# Patient Record
Sex: MALE | Race: WHITE | NOT HISPANIC OR LATINO | Employment: FULL TIME | URBAN - METROPOLITAN AREA
[De-identification: names, ages, dates, MRNs, and addresses within clinical notes are randomized per-mention and may not be internally consistent; named-entity substitution may affect disease eponyms.]

---

## 2019-12-04 ENCOUNTER — TRANSFERRED RECORDS (OUTPATIENT)
Dept: HEALTH INFORMATION MANAGEMENT | Facility: CLINIC | Age: 49
End: 2019-12-04

## 2020-12-11 ENCOUNTER — TRANSFERRED RECORDS (OUTPATIENT)
Dept: HEALTH INFORMATION MANAGEMENT | Facility: CLINIC | Age: 50
End: 2020-12-11

## 2020-12-22 ENCOUNTER — TRANSFERRED RECORDS (OUTPATIENT)
Dept: HEALTH INFORMATION MANAGEMENT | Facility: CLINIC | Age: 50
End: 2020-12-22

## 2021-01-25 ENCOUNTER — TRANSFERRED RECORDS (OUTPATIENT)
Dept: HEALTH INFORMATION MANAGEMENT | Facility: CLINIC | Age: 51
End: 2021-01-25

## 2021-07-21 ENCOUNTER — TRANSCRIBE ORDERS (OUTPATIENT)
Dept: OTHER | Age: 51
End: 2021-07-21

## 2021-07-21 DIAGNOSIS — G62.9 POLYNEUROPATHY: Primary | ICD-10-CM

## 2021-08-03 NOTE — TELEPHONE ENCOUNTER
RECORDS RECEIVED FROM: External   Date of Appt: 11/4/21   NOTES (FOR ALL VISITS) STATUS DETAILS   OFFICE NOTE from referring provider Care Everywhere Dr Trish Bravo @ [facility] (PCP):  7/20/21   OFFICE NOTE from other specialist Received Dr Phelps @ [facility] Neurology:  1/25/21    Dr Amy Miller @ [facility] PCP:  12/22/20 12/8/20   DISCHARGE SUMMARY from hospital N/A    DISCHARGE REPORT from the ER N/A    OPERATIVE REPORT N/A    MEDICATION LIST Care Everywhere    IMAGING  (FOR ALL VISITS)     EMG Received [facility] Neurology:  1/25/21    [facility]:  12/4/19   EEG N/A    LUMBAR PUNCTURE N/A    MIGUEL ÁNGEL SCAN N/A    ULTRASOUND (CAROTID BILAT) *VASCULAR* N/A    MRI (HEAD, NECK, SPINE) Received [facility]:  MRI Thoracic Spine 12/22/20  MRI Lumbar Spine 12/22/20  MRI Brain 12/11/20   CT (HEAD, NECK, SPINE) N/A       Action 8/3/21 MV 4.08pm   Action Taken Records request faxed to [facility]    Request for EMG faxed to [facility]    Records/imaging request faxed to [facility]     Action 8/4/21 MV 2.46pm   Action Taken EMG received from [facility] and sent to scanning     Action 8/5/21 MV 12.08pm   Action Taken Records received from [facility] and sent to scanning    Records received from [facility] and sent to scanning - images resolved in PACS

## 2021-11-04 ENCOUNTER — OFFICE VISIT (OUTPATIENT)
Dept: NEUROLOGY | Facility: CLINIC | Age: 51
End: 2021-11-04
Payer: COMMERCIAL

## 2021-11-04 ENCOUNTER — LAB (OUTPATIENT)
Dept: LAB | Facility: CLINIC | Age: 51
End: 2021-11-04
Payer: COMMERCIAL

## 2021-11-04 ENCOUNTER — PRE VISIT (OUTPATIENT)
Dept: NEUROLOGY | Facility: CLINIC | Age: 51
End: 2021-11-04

## 2021-11-04 VITALS
WEIGHT: 263 LBS | SYSTOLIC BLOOD PRESSURE: 144 MMHG | HEART RATE: 70 BPM | DIASTOLIC BLOOD PRESSURE: 83 MMHG | OXYGEN SATURATION: 99 % | RESPIRATION RATE: 16 BRPM

## 2021-11-04 DIAGNOSIS — R29.2 GENERALIZED HYPERREFLEXIA: ICD-10-CM

## 2021-11-04 DIAGNOSIS — R27.8 SENSORY ATAXIA: Primary | ICD-10-CM

## 2021-11-04 DIAGNOSIS — R27.8 SENSORY ATAXIA: ICD-10-CM

## 2021-11-04 LAB
HIV 1+2 AB+HIV1 P24 AG SERPL QL IA: NONREACTIVE
T PALLIDUM AB SER QL: NONREACTIVE
VIT B12 SERPL-MCNC: 376 PG/ML (ref 193–986)

## 2021-11-04 PROCEDURE — 82607 VITAMIN B-12: CPT | Performed by: PATHOLOGY

## 2021-11-04 PROCEDURE — 86255 FLUORESCENT ANTIBODY SCREEN: CPT | Mod: 90 | Performed by: PATHOLOGY

## 2021-11-04 PROCEDURE — 86038 ANTINUCLEAR ANTIBODIES: CPT | Mod: 90 | Performed by: PATHOLOGY

## 2021-11-04 PROCEDURE — 83519 RIA NONANTIBODY: CPT | Mod: 90 | Performed by: PATHOLOGY

## 2021-11-04 PROCEDURE — 99204 OFFICE O/P NEW MOD 45 MIN: CPT | Performed by: PSYCHIATRY & NEUROLOGY

## 2021-11-04 PROCEDURE — 84446 ASSAY OF VITAMIN E: CPT | Mod: 90 | Performed by: PATHOLOGY

## 2021-11-04 PROCEDURE — 87389 HIV-1 AG W/HIV-1&-2 AB AG IA: CPT | Mod: 90 | Performed by: PATHOLOGY

## 2021-11-04 PROCEDURE — 82525 ASSAY OF COPPER: CPT | Mod: 90 | Performed by: PATHOLOGY

## 2021-11-04 PROCEDURE — 36415 COLL VENOUS BLD VENIPUNCTURE: CPT | Performed by: PATHOLOGY

## 2021-11-04 PROCEDURE — 99000 SPECIMEN HANDLING OFFICE-LAB: CPT | Performed by: PATHOLOGY

## 2021-11-04 PROCEDURE — 86780 TREPONEMA PALLIDUM: CPT | Mod: 90 | Performed by: PATHOLOGY

## 2021-11-04 PROCEDURE — 86790 VIRUS ANTIBODY NOS: CPT | Mod: 90 | Performed by: PATHOLOGY

## 2021-11-04 RX ORDER — ERGOCALCIFEROL 1.25 MG/1
CAPSULE ORAL
COMMUNITY
Start: 2021-10-21

## 2021-11-04 ASSESSMENT — PAIN SCALES - GENERAL: PAINLEVEL: NO PAIN (0)

## 2021-11-04 NOTE — LETTER
2021       RE: Pernell Martinez  [address] [place] [id number]     Dear Colleague,    Thank you for referring your patient, Pernell Martinez, to the [facility] [place] at [facility]. Please see a copy of my visit note below.    Service Date: 2021    Trish Bravo MD  [id number] [place]  [address]    RE:  Pernell Mratinez  MRN:  [id number]  :  1970    Dear Dr. Bravo:    I had the pleasure to see Mr. Martinez at the [facility].  Thank you for this referral.  As you know, he is a 50-year-old man from [place], [place], whose chief complaint is numbness and unsteady gait.  Symptoms began about 16 months ago in the summer of 2020 without any obvious trigger.  He first had a left tennis elbow and he had a corticosteroid injection there that worked.  A few days later, he noticed numbness in the entire left hand affecting all fingers, but no pain there.  The numbness in the hand would not be positional or dependent on repetitive flexion of the wrist. Within a week, he had similar symptoms in the right hand with predominantly negative sensory symptoms, numbness, and then a few weeks later, the same happened at bilateral thighs anteriorly and medially, as well as at the feet and toes.  He describes very little if any neuropathic pain.  It is mostly loss of sensation and heaviness. It never bothers him when he is asleep.  He has noted over the past 6-7 months a patch of numbness in the abdominal wall approximately at the umbilicus, which encompasses it circumferentially and extends to the posterior back.  He does not have allodynia. He has noted some urgency of urination, especially in the last 6 months, but no definite incontinence.  He denies erectile dysfunction, dry eyes or dry mouth, signs of gastroparesis, orthostatic intolerance or syncope.  He feels that his gait is  unsteady and he falls quite often.  He also drops things from his hands.     He does not have any history of diabetes, cancer, radiation or chemotherapy.  He drinks alcohol very rarely.  Does not smoke, or use illicit drugs.  His mother probably had neuropathy in her legs.  His past medical history is relatively free.      He was offered an antidepressant for a few weeks, but it did not help him and he stopped it more than 3 months ago.  He denies any constitutional symptoms such as anorexia, weight loss, night sweats, fevers, blood in urine or stool, change in bowel habits or signs of connective tissue disease such as persistent joint swelling, oral ulcers, persistent skin rashes (other than known psoriasis), kidney problems, dysphagia, etc.  He denies any trouble with his vision.      He was seen at [facility] initially where he had an EMG and then at [facility] Neurology by Dr. Phelps who did an extensive EMG study of right upper and lower extremity, which was completely normal.  There was no evidence of neuropathy or myopathy.  He had an MRI of the brain, thoracic and lumbar spine at [facility] in 12/2020. 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  I did not have a chance to review the images.  Lumbar spine MRI was done without contrast and showed mild discogenic changes at L4-L5 but no significant central canal or neural foraminal stenosis or cauda equina lesion.  MRI thoracic spine also done 12/2020 was unremarkable, again without contrast.  In terms of labs, he had a Lyme titer, B12, TSH, comprehensive metabolic panel, hemogram and lipid panel that were normal.    BP (!) 144/83   Pulse 70   Resp 16   Wt 119.3 kg (263 lb)   SpO2 99%     On exam, he is awake, alert and oriented x3.  He looks a bit depressed with a flat affect on my exam.  Cranial nerve examination shows pupils equal, reactive to light and  accommodation.  Visual fields are full to confrontation bilaterally.  There is no afferent pupillary defect.  Extraocular muscles are intact.  I did not see any nystagmus or saccadic  abnormality.  There is no weakness of orbicularis oculi, oris, uvula, jaw, palate or tongue.  There is no dysphonia or dysarthria.  No tongue fasciculations or atrophy.  He has a slightly present jaw jerk, although not super brisk.  Neck flexion and extension strength are normal.  Strength is 5/5 in upper and lower extremities proximally and distally.  Muscle tone and bulk are normal.  Reflexes were very brisk throughout.  There were 3+ in biceps, triceps, brachioradialis, 3+ at the knees and I would say 4+ at the ankles as I was able to detect sustained ankle clonus of more than 3-4 beats on both sides.  Toes were bilaterally upgoing.  He had a positive Mateo reflex and positive pectoralis response as well.  Sensory exam was grossly abnormal.  He could not feel vibration at the toes.  It was at most a couple of seconds at the medial malleoli and the knees, and no more than 4-5 seconds at the index fingers.  All those numbers are abnormal.  Position sensation was intact.  Pinprick was better preserved and he could distinguish sharp from blunt in most locations.  Finger-to-nose was done without dysmetria. He was able to get up from a chair without assistance.  He was walking slightly wide based.  Romberg sign was clearly positive.  He could do tandem gait about 4 steps, but a bit unsteadily.    IMPRESSION:  Mr. Martinez's examination shows vibration sensory loss that is quite severe, and generalized hyperreflexia with Babinski sign.  Those signs put the problem in the central nervous system and not the peripheral.  Differential diagnosis is broad and initial MRIs were not informative.  This could be a demyelinating disease, a paraneoplastic syndrome, other CNS inflammatory disorder, etc.  Possibility of neoplastic disease like a brain  tumour is lower as this did not show up on his original scans.      He needs extensive workup. MRI of the brain, cervical and thoracic spine with contrast absolutely have to be repeated and I will get some labs today including a repeat B12 level, copper, vitamin E, RASHID antibodies, syphilis serology, paraneoplastic antibodies, HIV and HTLV.  If the brain scans remain normal and the labs are uninformative, he will need a CSF examination.  Because this problem is clearly not neuromuscular and outside the scope of my practice, I would recommend that he follows with a neurologist locally to address any potential abnormal findings of the workup I ordered.  I would recommend Dr. Ela Garza at [facility].    Thank you for allowing me to participate in the care of Mr. Maddox. Total time spent on this encounter today 55 minutes including 35 face-to-face with the patient, 10 on post-visit note dictation, editing and orders, and10 on pre-visit chart review.    Sincerely,    Ramy Ayala MD        D: 2021   T: 2021   MT: zina    Name:     SERJIO MADDOX  MRN:      [id number]        Account:      [id number]   :      1970           Service Date: 2021       Document: [id number]

## 2021-11-04 NOTE — PATIENT INSTRUCTIONS
Your exam shows sensory loss and generalized hyperreflexia (very increased to pathologic reflexes) and Babinski sign. Patients with those signs never have peripheral neuropathy or muscle disease explaining their presentation. They almost always have a disorder of the brain or the spinal cord, like MS, other inflammatory/autoimmune disease, paraneoplastic disease, or much less likely a brain tumor (if that were the case it would have been seen at your original brain scan)    We need to repeat your MRIs brain- cervical-thoracic spine, with contrast  Please get some lab tests today  If everything is still negative you will likely need a spinal tap- but I won't order it until I see the scans  Please consider following with a local Neurologist in [place] like Dr Ela Garza at [facility]

## 2021-11-04 NOTE — PROGRESS NOTES
Service Date: 2021    Trish Bravo MD  [facility]  [address] [place]  [address]    RE:  Pernell Martinez  MRN:  [id number]  :  1970    Dear Dr. Bravo:    I had the pleasure to see Mr. Martinez at the [facility].  Thank you for this referral.  As you know, he is a 50-year-old man from [place], [place], whose chief complaint is numbness and unsteady gait.  Symptoms began about 16 months ago in the summer of 2020 without any obvious trigger.  He first had a left tennis elbow and he had a corticosteroid injection there that worked.  A few days later, he noticed numbness in the entire left hand affecting all fingers, but no pain there.  The numbness in the hand would not be positional or dependent on repetitive flexion of the wrist. Within a week, he had similar symptoms in the right hand with predominantly negative sensory symptoms, numbness, and then a few weeks later, the same happened at bilateral thighs anteriorly and medially, as well as at the feet and toes.  He describes very little if any neuropathic pain.  It is mostly loss of sensation and heaviness. It never bothers him when he is asleep.  He has noted over the past 6-7 months a patch of numbness in the abdominal wall approximately at the umbilicus, which encompasses it circumferentially and extends to the posterior back.  He does not have allodynia. He has noted some urgency of urination, especially in the last 6 months, but no definite incontinence.  He denies erectile dysfunction, dry eyes or dry mouth, signs of gastroparesis, orthostatic intolerance or syncope.  He feels that his gait is unsteady and he falls quite often.  He also drops things from his hands.     He does not have any history of diabetes, cancer, radiation or chemotherapy.  He drinks alcohol very rarely.  Does not smoke, or use illicit drugs.  His mother probably had neuropathy in her legs.  His past medical history is  relatively free.      He was offered an antidepressant for a few weeks, but it did not help him and he stopped it more than 3 months ago.  He denies any constitutional symptoms such as anorexia, weight loss, night sweats, fevers, blood in urine or stool, change in bowel habits or signs of connective tissue disease such as persistent joint swelling, oral ulcers, persistent skin rashes (other than known psoriasis), kidney problems, dysphagia, etc.  He denies any trouble with his vision.      He was seen at [facility] initially where he had an EMG and then at [facility] Neurology by Dr. Phelps who did an extensive EMG study of right upper and lower extremity, which was completely normal.  There was no evidence of neuropathy or myopathy.  He had an MRI of the brain, thoracic and lumbar spine at [facility] in 12/2020. 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  I did not have a chance to review the images.  Lumbar spine MRI was done without contrast and showed mild discogenic changes at L4-L5 but no significant central canal or neural foraminal stenosis or cauda equina lesion.  MRI thoracic spine also done 12/2020 was unremarkable, again without contrast.  In terms of labs, he had a Lyme titer, B12, TSH, comprehensive metabolic panel, hemogram and lipid panel that were normal.    BP (!) 144/83   Pulse 70   Resp 16   Wt 119.3 kg (263 lb)   SpO2 99%     On exam, he is awake, alert and oriented x3.  He looks a bit depressed with a flat affect on my exam.  Cranial nerve examination shows pupils equal, reactive to light and accommodation.  Visual fields are full to confrontation bilaterally.  There is no afferent pupillary defect.  Extraocular muscles are intact.  I did not see any nystagmus or saccadic  abnormality.  There is no weakness of orbicularis oculi, oris, uvula, jaw, palate or tongue.  There is no dysphonia or  dysarthria.  No tongue fasciculations or atrophy.  He has a slightly present jaw jerk, although not super brisk.  Neck flexion and extension strength are normal.  Strength is 5/5 in upper and lower extremities proximally and distally.  Muscle tone and bulk are normal.  Reflexes were very brisk throughout.  There were 3+ in biceps, triceps, brachioradialis, 3+ at the knees and I would say 4+ at the ankles as I was able to detect sustained ankle clonus of more than 3-4 beats on both sides.  Toes were bilaterally upgoing.  He had a positive Mateo reflex and positive pectoralis response as well.  Sensory exam was grossly abnormal.  He could not feel vibration at the toes.  It was at most a couple of seconds at the medial malleoli and the knees, and no more than 4-5 seconds at the index fingers.  All those numbers are abnormal.  Position sensation was intact.  Pinprick was better preserved and he could distinguish sharp from blunt in most locations.  Finger-to-nose was done without dysmetria. He was able to get up from a chair without assistance.  He was walking slightly wide based.  Romberg sign was clearly positive.  He could do tandem gait about 4 steps, but a bit unsteadily.    IMPRESSION:  Mr. Martinez's examination shows vibration sensory loss that is quite severe, and generalized hyperreflexia with Babinski sign.  Those signs put the problem in the central nervous system and not the peripheral.  Differential diagnosis is broad and initial MRIs were not informative.  This could be a demyelinating disease, a paraneoplastic syndrome, other CNS inflammatory disorder, etc.  Possibility of neoplastic disease like a brain tumour is lower as this did not show up on his original scans.      He needs extensive workup. MRI of the brain, cervical and thoracic spine with contrast absolutely have to be repeated and I will get some labs today including a repeat B12 level, copper, vitamin E, RASHID antibodies, syphilis serology,  paraneoplastic antibodies, HIV and HTLV.  If the brain scans remain normal and the labs are uninformative, he will need a CSF examination.  Because this problem is clearly not neuromuscular and outside the scope of my practice, I would recommend that he follows with a neurologist locally to address any potential abnormal findings of the workup I ordered.  I would recommend Dr. Ela Garza at [facility].    Thank you for allowing me to participate in the care of Mr. Maddox. Total time spent on this encounter today 55 minutes including 35 face-to-face with the patient, 10 on post-visit note dictation, editing and orders, and10 on pre-visit chart review.    Sincerely,    Ramy Ayala MD        D: 2021   T: 2021   MT: zina    Name:     SERJIO MADDOX  MRN:      [id number]        Account:      [id number]   :      1970           Service Date: 2021       Document: [id number]

## 2021-11-05 LAB
ANA SER QL IF: NEGATIVE
HTLV I+II AB SER QL IA: NEGATIVE

## 2021-11-07 LAB
A-TOCOPHEROL VIT E SERPL-MCNC: 9.3 MG/L
BETA+GAMMA TOCOPHEROL SERPL-MCNC: 1 MG/L
COPPER SERPL-MCNC: 93.9 UG/DL

## 2021-11-09 LAB
AMPHIPHYSIN AB TITR SER: NEGATIVE TITER
CV2 IGG TITR SER: NEGATIVE TITER
GLIAL NUC TYPE 1 AB TITR SER: NEGATIVE TITER
HU1 AB TITR SER: NEGATIVE TITER
HU2 AB TITR SER IF: NEGATIVE TITER
HU3 AB TITR SER: NEGATIVE TITER
IMMUNOLOGIST REVIEW: NORMAL
LABORATORY COMMENT REPORT: NORMAL
NACHR AB SER-SCNC: 0 NMOL/L
PCA-1 AB TITR SER: NEGATIVE TITER
PCA-2 AB TITR SER: NEGATIVE TITER
PCA-TR AB TITR SER IF: NEGATIVE TITER
VGCC-P/Q BIND AB SER-SCNC: 0 NMOL/L
VGKC AB SER-SCNC: 0 NMOL/L

## 2021-11-29 ENCOUNTER — TRANSFERRED RECORDS (OUTPATIENT)
Dept: HEALTH INFORMATION MANAGEMENT | Facility: CLINIC | Age: 51
End: 2021-11-29
Payer: COMMERCIAL

## 2021-12-10 ENCOUNTER — OFFICE VISIT (OUTPATIENT)
Dept: NEUROSURGERY | Facility: CLINIC | Age: 51
End: 2021-12-10
Attending: NEUROLOGICAL SURGERY
Payer: COMMERCIAL

## 2021-12-10 VITALS — OXYGEN SATURATION: 98 % | HEART RATE: 74 BPM | SYSTOLIC BLOOD PRESSURE: 162 MMHG | DIASTOLIC BLOOD PRESSURE: 79 MMHG

## 2021-12-10 DIAGNOSIS — G99.2 STENOSIS OF CERVICAL SPINE WITH MYELOPATHY (H): Primary | ICD-10-CM

## 2021-12-10 DIAGNOSIS — Z01.818 PRE-OP TESTING: ICD-10-CM

## 2021-12-10 DIAGNOSIS — Z11.59 ENCOUNTER FOR SCREENING FOR OTHER VIRAL DISEASES: ICD-10-CM

## 2021-12-10 DIAGNOSIS — M48.02 STENOSIS OF CERVICAL SPINE WITH MYELOPATHY (H): Primary | ICD-10-CM

## 2021-12-10 PROCEDURE — G0463 HOSPITAL OUTPT CLINIC VISIT: HCPCS

## 2021-12-10 PROCEDURE — 99204 OFFICE O/P NEW MOD 45 MIN: CPT | Performed by: NEUROLOGICAL SURGERY

## 2021-12-10 NOTE — NURSING NOTE
Reviewed pre- and post-operative instructions as outlined in the After Visit Summary/Patient Instructions with patient.   Surgery folder was given to patient    Patient Education Topic: Procedure with Dr. Lees     Learner(s): Patient and Significant other/Spouse     Knowledge Level: Basic    Readiness to Learn: Ready    Method:  Verbal explanation and Written material     Outcome: Able to verbalize instructions    Barriers to Learning: No barrier    Covid Testing: patient educated they will need to have a test for the novel Coronavirus, COVID-19.The test needs to be completed within 4 days (96) hours of surgery. Order Placed.    Scheduling Number: [phone]    NDI/PEARL: Confirmation of completion within last 6 months     Patient had the opportunity for questions to be answered. Advised Patient and Significant other/Spouse  to call clinic with any questions/concerns. Gave patient antibacterial soap for pre-surgery skin preparation.

## 2021-12-10 NOTE — PATIENT INSTRUCTIONS
Patient Instructions    Surgery scheduled at [facility] for Right Cervical 5 corpectomy with cage reconstruction, plating, arthrodesis using local autograft. Sky sandoval placement with Dr. Lees    Pre-Operative    Surgical risks: blood clots in the leg or lung, problems urinating, nerve damage, drainage from the incision, infection,stiffness    Pre-operative physical with primary care physician within 30 days of surgical date.    2-4 night hospitalization stay    Shower procedure  o Please shower with antimicrobial soap the night before surgery and morning of surgery. Please refer to showering instruction sheet in folder.    Eating/Drinking  o Stop all solid foods 8 hours before surgery.  o Keep drinking clear liquids until 4 hours before surgery  - Clear liquids include water, clear juice, black coffee, or clear tea without milk, Gatorade, clear soda.     Medications  o Hold Aspirin, NSAIDs (Advil/Ibuprofen, Indocin, Naproxen,Nuprin,Relafen/Nabumetone, Diclofenac,Meloxicam, Aleve, Celebrex) x 7 days prior to surgical date  o Hold methotrexate, Xeljanz for 1-2 weeks prior to surgery and continue to hold 2 weeks post surgery.   o You can take Tylenol (Acetaminophen) for pain, 1000 mg  - Do not exceed 3,000 mg per day   o Any other medications prescribed, please discuss with your primary care provider at your pre-operative physical     As part of preparation for your upcoming procedure you are required to have a test for the novel Coronavirus, COVID-19.  o Please call the drive-up testing number to schedule your appointment. The test needs to be completed within 4 days (96) hours of surgery.   o Scheduling Number: [phone]  o You may NOT receive the COVID-19 vaccine 72 hours before or after surgery.    Pain Management    Dealing with pain  o As your body heals, you might feel a stabbing, burning, or aching pain. You may also have some numbness.  o Everyone feels pain differently, we may ask you  to rate your pain using a pain scale. This will let us know how much pain you feel.   o Keep in mind that medicine won't take away all of your pain. It helps to try other ways to relax and ease pain.     Things to help with pain  o After surgery, we will give you medicine for your pain. These medications work well, but they can make you drowsy, itchy, or sick to your stomach. If we give you narcotics for pain, try to take the pills with food.   o For mild to moderate pain, you can take medication such as Tylenol. These can be used with narcotics, but make sure that your narcotic does not contain Tylenol.   - Do NOT drive while taking narcotic pain medication  - Do NOT drink alcohol while using any pain medication  o You can utilize ice as needed (no longer than 20 minutes at one time)    You may resume taking NSAIDs (ex. Ibuprofen, aleve, naproxen) 12 weeks after surgery as it may cause bleeding and interfere with bone healing     Incision Care    No submerging incision in water such as pools, hot tubs, baths for at least 8 weeks or until incision is healed    You may get your incision wet in the shower. Allow water to run over incision, and gently pat dry.     Remove dressing as instructed upon discharge    Watch for signs of infection  o Redness, swelling, warmth, drainage, and fever of 101 degrees or higher  o Notify clinic [phone].    Activity Restrictions    For the first 6-8 weeks, no lifting > 10 pounds, no bending, twisting, or overhead reaching.    Take stairs in moderation     Ok to walk as tolerated, take short frequent walks. You may gradually increase the distance as tolerated.     Avoid bed rest and prolonged sitting for longer than 30 minutes (change positions frequently while awake)    No contact sports until after follow up visit    No high impact activities such as; running/jogging, snowmobile or 4 gruber riding or any other recreational vehicles    Please call the clinic if you develop any  of the following symptoms:  o Swelling and/or warmth in one or both legs  o Pain or tenderness in your leg, ankle, foot, or arm   o Red or discolored skin     Post-Op Follow Up Appointments    2 week staple/suture removal with RN    6 week post op with xray prior     3 months post op with xray prior     1 year post op with xray prior    Please call to schedule follow up and xray appointments at [phone]    Resources    If you are currently employed, you will need to be off work for 4-6 weeks for post op recovery and healing.    Please fax any FMLA/short term disability paperwork to [phone]    You may call our clinic when you'd like to return to work and we can provide a work letter    To allow staff adequate time to complete paperwork, please send as soon as possible

## 2021-12-10 NOTE — LETTER
12/10/2021         RE: Pernell Martinez  [address] [place] [id number]        Dear Colleague,    Thank you for referring your patient, Pernell Martinez, to the [facility] NEUROSURGERY [facility]INIC [place]. Please see a copy of my visit note below.    Dear Ramy,      It was a pleasure to see Pernell Martinez today in Neurosurgery Clinic. He is a 51 year old male who is here for evaluation of his cervical stenosis with myelopathy.    The patient describes about 1-1/2 years of progressive symptoms including numbness that started in the left hand but is now bilateral.  He also describes numbness of the hands and feet.  He has some neck aching but not a lot of pain.  He describes problems with balance and stiffness.  He has coordination problems of the hands with buttoning buttons and handwriting.    His symptoms have progressively worsened and this seems to extend have accelerated in the last month or 2.    No past medical history on file.  Past Medical History reviewed with patient during visit.  No past surgical history on file.  Past Surgical History reviewed with patient during visit.  Allergies   Allergen Reactions     Cats      Other reaction(s): Sneezing     Dogs      Other reaction(s): Sneezing     Hepatitis B Virus Vaccine Other (See Comments)     HEPATITIS LIKE SYMPTOMS     Other Environmental Allergy      Seasonal allergies       Current Outpatient Medications:      ergocalciferol (ERGOCALCIFEROL) 1.25 MG (89660 UT) capsule, 1 unit of Vitamin D equals 0.025 mcg of Vitamin D. Take 50,000 units by mouth  wice a week., Disp: , Rfl:   Social History     Socioeconomic History     Marital status:      Spouse name: Not on file     Number of children: Not on file     Years of education: Not on file     Highest education level: Not on file   Occupational History     Not on file   Tobacco Use     Smoking status: Not on file     Smokeless tobacco: Not on file   Substance and Sexual Activity     Alcohol  use: Not on file     Drug use: Not on file     Sexual activity: Not on file   Other Topics Concern     Not on file   Social History Narrative     Not on file     Social Determinants of Health     Financial Resource Strain: Not on file   Food Insecurity: Not on file   Transportation Needs: Not on file   Physical Activity: Not on file   Stress: Not on file   Social Connections: Not on file   Intimate Partner Violence: Not on file   Housing Stability: Not on file     No family history on file.     ROS: 10 point ROS neg other than the symptoms noted above in the HPI.    Vitals:    12/10/21 1130   BP: (!) 162/79   Pulse: 74   SpO2: 98%     There is no height or weight on file to calculate BMI.  Data Unavailable    Neck Disability Index  Neck Disability Index (  [employer]. 1991. All rights reserved.; used with permission) 12/10/2021   SECTION 1 - PAIN INTENSITY 1   SECTION 2 - PERSONAL CARE 0   SECTION 3 - LIFTING 2   SECTION 4 - READING 1   SECTION 5 - HEADACHES 0   SECTION 6 - CONCENTRATION 2   SECTION 7 - WORK 3   SECTION 8 - DRIVING 0   SECTION 9 - SLEEPING 2   SECTION 10 - RECREATION 2   Count 10   Sum 13   Raw Score: /50 13   Neck Disability Index Score: (%) 26       Visual Analog Scale (VAS) Questionnaire    No flowsheet data found.       Awake alert and oriented.  Bilateral upper and lower extremity strength is 5 out of 5 in all muscle groups.    Upper extremity reflexes are 1-2+ in the biceps and triceps    Bilateral lower extremities patellar reflexes are 3+.    Toes are upgoing bilaterally.    Imaging: MRI of the cervical spine shows a large disc herniation on the left at C4-5 as well as moderate disc herniation on the left at C5-6 causing severe canal stenosis with spinal cord signal change.  The imaging was reviewed with the patient showed the patient clinic today.    Assessment: Cervical stenosis from herniated disc with myelopathy.    Plan: The patient seems to have progressive symptoms that  are worsening.  I think his surgery should be undertaken in a urgent fashion.  I have recommended a C5 corpectomy with cage reconstruction as there is some of his disc herniation is seems to have gone behind the C4 vertebral body and I do not think this would be accessible via a standard discectomy.The risks benefits and alternatives to the procedure were discussed. Risks include, but are not limited to, bleeding, infection, neurologic injury such as spinal cord or nerve root injury, neck hematoma, vocal cord paralysis, swallowing difficulty, injury to structures in the anterior neck such as the vascular or aerodigestive structures, need for further surgery or revision of the implanted hardware, failure for symptoms to improve, possible off label use of medications or substances. Questions were solicited and answered, and the patient wishes to proceed with surgery.             Again, thank you for allowing me to participate in the care of your patient.        Sincerely,        Aly Lees MD

## 2021-12-10 NOTE — PROGRESS NOTES
Dear Ramy,      It was a pleasure to see Pernell Martinez today in Neurosurgery Clinic. He is a 51 year old male who is here for evaluation of his cervical stenosis with myelopathy.    The patient describes about 1-1/2 years of progressive symptoms including numbness that started in the left hand but is now bilateral.  He also describes numbness of the hands and feet.  He has some neck aching but not a lot of pain.  He describes problems with balance and stiffness.  He has coordination problems of the hands with buttoning buttons and handwriting.    His symptoms have progressively worsened and this seems to extend have accelerated in the last month or 2.    No past medical history on file.  Past Medical History reviewed with patient during visit.  No past surgical history on file.  Past Surgical History reviewed with patient during visit.  Allergies   Allergen Reactions     Cats      Other reaction(s): Sneezing     Dogs      Other reaction(s): Sneezing     Hepatitis B Virus Vaccine Other (See Comments)     HEPATITIS LIKE SYMPTOMS     Other Environmental Allergy      Seasonal allergies       Current Outpatient Medications:      ergocalciferol (ERGOCALCIFEROL) 1.25 MG (35439 UT) capsule, 1 unit of Vitamin D equals 0.025 mcg of Vitamin D. Take 50,000 units by mouth  wice a week., Disp: , Rfl:   Social History     Socioeconomic History     Marital status:      Spouse name: Not on file     Number of children: Not on file     Years of education: Not on file     Highest education level: Not on file   Occupational History     Not on file   Tobacco Use     Smoking status: Not on file     Smokeless tobacco: Not on file   Substance and Sexual Activity     Alcohol use: Not on file     Drug use: Not on file     Sexual activity: Not on file   Other Topics Concern     Not on file   Social History Narrative     Not on file     Social Determinants of Health     Financial Resource Strain: Not on file   Food Insecurity: Not  on file   Transportation Needs: Not on file   Physical Activity: Not on file   Stress: Not on file   Social Connections: Not on file   Intimate Partner Violence: Not on file   Housing Stability: Not on file     No family history on file.     ROS: 10 point ROS neg other than the symptoms noted above in the HPI.    Vitals:    12/10/21 1130   BP: (!) 162/79   Pulse: 74   SpO2: 98%     There is no height or weight on file to calculate BMI.  Data Unavailable    Neck Disability Index  Neck Disability Index (  [employer]ell [employer]. 1991. All rights reserved.; used with permission) 12/10/2021   SECTION 1 - PAIN INTENSITY 1   SECTION 2 - PERSONAL CARE 0   SECTION 3 - LIFTING 2   SECTION 4 - READING 1   SECTION 5 - HEADACHES 0   SECTION 6 - CONCENTRATION 2   SECTION 7 - WORK 3   SECTION 8 - DRIVING 0   SECTION 9 - SLEEPING 2   SECTION 10 - RECREATION 2   Count 10   Sum 13   Raw Score: /50 13   Neck Disability Index Score: (%) 26       Visual Analog Scale (VAS) Questionnaire    No flowsheet data found.       Awake alert and oriented.  Bilateral upper and lower extremity strength is 5 out of 5 in all muscle groups.    Upper extremity reflexes are 1-2+ in the biceps and triceps    Bilateral lower extremities patellar reflexes are 3+.    Toes are upgoing bilaterally.    Imaging: MRI of the cervical spine shows a large disc herniation on the left at C4-5 as well as moderate disc herniation on the left at C5-6 causing severe canal stenosis with spinal cord signal change.  The imaging was reviewed with the patient showed the patient clinic today.    Assessment: Cervical stenosis from herniated disc with myelopathy.    Plan: The patient seems to have progressive symptoms that are worsening.  I think his surgery should be undertaken in a urgent fashion.  I have recommended a C5 corpectomy with cage reconstruction as there is some of his disc herniation is seems to have gone behind the C4 vertebral body and I do not think this would  be accessible via a standard discectomy.The risks benefits and alternatives to the procedure were discussed. Risks include, but are not limited to, bleeding, infection, neurologic injury such as spinal cord or nerve root injury, neck hematoma, vocal cord paralysis, swallowing difficulty, injury to structures in the anterior neck such as the vascular or aerodigestive structures, need for further surgery or revision of the implanted hardware, failure for symptoms to improve, possible off label use of medications or substances. Questions were solicited and answered, and the patient wishes to proceed with surgery.

## 2021-12-10 NOTE — NURSING NOTE
Pernell Martinez is a 51 year old male who presents for:  Chief Complaint   Patient presents with     Consult     Cervical Stenosis        Initial Vitals:  BP (!) 162/79   Pulse 74   SpO2 98%  There is no height or weight on file to calculate BMI.. There is no height or weight on file to calculate BSA. BP completed using cuff size: regular  Data Unavailable    Sahil Garcia MA

## 2021-12-10 NOTE — LETTER
[facility]  Neurosurgery Clinic  [address] [place] [address]  [id number]        12/10/2021    To Whom it May Concern,      Pernell Martinez is being followed at our clinic. He is able to work up until his surgery on 12/16/21 with restrictions of light duty work only:    -No heavy lifting greater than 10 pounds   -No twisting or bending  -No overhead reaching work  -No stairs  -No running machinery  -No running tractor  -No shoveling    Pernell will be re-evaluated after surgery at the next follow up visit. Please call our clinic with questions or concerns: [phone]      Sincerely,  Aly Lees  (electronically signed)

## 2021-12-12 ENCOUNTER — HEALTH MAINTENANCE LETTER (OUTPATIENT)
Age: 51
End: 2021-12-12

## 2021-12-15 ENCOUNTER — TRANSFERRED RECORDS (OUTPATIENT)
Dept: MULTI SPECIALTY CLINIC | Facility: CLINIC | Age: 51
End: 2021-12-15
Payer: COMMERCIAL

## 2021-12-15 ENCOUNTER — ANESTHESIA EVENT (OUTPATIENT)
Dept: SURGERY | Facility: CLINIC | Age: 51
DRG: 472 | End: 2021-12-15
Payer: COMMERCIAL

## 2021-12-15 LAB
CHOLESTEROL (EXTERNAL): 183 MG/DL (ref 114–200)
CREATININE (EXTERNAL): 0.84 MG/DL (ref 0.7–1.2)
GFR ESTIMATED (EXTERNAL): >60 ML/MIN/1.73M2
GLUCOSE (EXTERNAL): 90 MG/DL (ref 70–99)
HDLC SERPL-MCNC: 39 MG/DL (ref 40–60)
LDL CHOLESTEROL CALCULATED (EXTERNAL): 123 MG/DL
POTASSIUM (EXTERNAL): 4.1 MEQ/L (ref 3.4–5.1)
TRIGLYCERIDES (EXTERNAL): 104 MG/DL (ref 10–200)

## 2021-12-15 RX ORDER — TRIAMCINOLONE ACETONIDE 1 MG/G
OINTMENT TOPICAL
COMMUNITY
Start: 2021-11-18

## 2021-12-15 NOTE — PROGRESS NOTES
PTA medications updated by Medication Scribe prior to surgery via phone call with patient (last doses completed by Nurse)     Medication history sources: Patient, Surescripts and H&P  In the past week, patient estimated taking medication this percent of the time: Greater than 90%  Adherence assessment: N/A Not Observed    Significant changes made to the medication list:  None      Additional medication history information:   None    Medication reconciliation completed by provider prior to medication history? No    Time spent in this activity: 20 minutes    The information provided in this note is only as accurate as the sources available at the time of update(s)      Prior to Admission medications    Medication Sig Last Dose Taking? Auth Provider   ergocalciferol (ERGOCALCIFEROL) 1.25 MG (50923 UT) capsule 1 unit of Vitamin D equals 0.025 mcg of Vitamin D. Take 50,000 units by mouth  wice a week. 12/6/2021 at AM Yes Reported, Patient   triamcinolone (KENALOG) 0.1 % external ointment   at PRN Yes Reported, Patient       Medication history completed by:    Jadon Fairbanks CPhT  Medication Scribe  [facility]

## 2021-12-16 ENCOUNTER — HOSPITAL ENCOUNTER (INPATIENT)
Facility: CLINIC | Age: 51
LOS: 2 days | Discharge: HOME | DRG: 472 | End: 2021-12-18
Attending: NEUROLOGICAL SURGERY | Admitting: NEUROLOGICAL SURGERY
Payer: COMMERCIAL

## 2021-12-16 ENCOUNTER — ANESTHESIA (OUTPATIENT)
Dept: SURGERY | Facility: CLINIC | Age: 51
DRG: 472 | End: 2021-12-16
Payer: COMMERCIAL

## 2021-12-16 ENCOUNTER — APPOINTMENT (OUTPATIENT)
Dept: GENERAL RADIOLOGY | Facility: CLINIC | Age: 51
DRG: 472 | End: 2021-12-16
Attending: NEUROLOGICAL SURGERY
Payer: COMMERCIAL

## 2021-12-16 DIAGNOSIS — Z41.9 SURGERY, ELECTIVE: ICD-10-CM

## 2021-12-16 DIAGNOSIS — M48.02 STENOSIS OF CERVICAL SPINE WITH MYELOPATHY (H): Primary | ICD-10-CM

## 2021-12-16 DIAGNOSIS — G99.2 STENOSIS OF CERVICAL SPINE WITH MYELOPATHY (H): Primary | ICD-10-CM

## 2021-12-16 LAB
ABO/RH(D): NORMAL
ANTIBODY SCREEN: NEGATIVE
ANTIBODY SCREEN: NEGATIVE
SPECIMEN EXPIRATION DATE: NORMAL

## 2021-12-16 PROCEDURE — 258N000003 HC RX IP 258 OP 636: Performed by: ANESTHESIOLOGY

## 2021-12-16 PROCEDURE — 999N000141 HC STATISTIC PRE-PROCEDURE NURSING ASSESSMENT: Performed by: NEUROLOGICAL SURGERY

## 2021-12-16 PROCEDURE — 999N000179 XR SURGERY CARM FLUORO LESS THAN 5 MIN W STILLS

## 2021-12-16 PROCEDURE — 86900 BLOOD TYPING SEROLOGIC ABO: CPT | Performed by: NEUROLOGICAL SURGERY

## 2021-12-16 PROCEDURE — L0172 CERV COL SR FOAM 2PC PRE OTS: HCPCS

## 2021-12-16 PROCEDURE — 250N000011 HC RX IP 250 OP 636: Performed by: ANESTHESIOLOGY

## 2021-12-16 PROCEDURE — 8E09XBF COMPUTER ASSISTED PROCEDURE OF HEAD AND NECK REGION, WITH FLUOROSCOPY: ICD-10-PCS | Performed by: NEUROLOGICAL SURGERY

## 2021-12-16 PROCEDURE — C1762 CONN TISS, HUMAN(INC FASCIA): HCPCS | Performed by: NEUROLOGICAL SURGERY

## 2021-12-16 PROCEDURE — 22854 INSJ BIOMECHANICAL DEVICE: CPT | Performed by: NEUROLOGICAL SURGERY

## 2021-12-16 PROCEDURE — 922N000001 HC NEURO MONITORING SERVICE, UP TO 7 HOURS (T1FEE): Performed by: NEUROLOGICAL SURGERY

## 2021-12-16 PROCEDURE — 63081 REMOVE VERT BODY DCMPRN CRVL: CPT | Performed by: NEUROLOGICAL SURGERY

## 2021-12-16 PROCEDURE — 22845 INSERT SPINE FIXATION DEVICE: CPT | Mod: 59 | Performed by: NEUROLOGICAL SURGERY

## 2021-12-16 PROCEDURE — 250N000013 HC RX MED GY IP 250 OP 250 PS 637: Performed by: PHYSICIAN ASSISTANT

## 2021-12-16 PROCEDURE — 250N000025 HC SEVOFLURANE, PER MIN: Performed by: NEUROLOGICAL SURGERY

## 2021-12-16 PROCEDURE — 250N000011 HC RX IP 250 OP 636: Performed by: NEUROLOGICAL SURGERY

## 2021-12-16 PROCEDURE — 0RG207J FUSION OF 2 OR MORE CERVICAL VERTEBRAL JOINTS WITH AUTOLOGOUS TISSUE SUBSTITUTE, POSTERIOR APPROACH, ANTERIOR COLUMN, OPEN APPROACH: ICD-10-PCS | Performed by: NEUROLOGICAL SURGERY

## 2021-12-16 PROCEDURE — 0PB30ZZ EXCISION OF CERVICAL VERTEBRA, OPEN APPROACH: ICD-10-PCS | Performed by: NEUROLOGICAL SURGERY

## 2021-12-16 PROCEDURE — 22854 INSJ BIOMECHANICAL DEVICE: CPT | Mod: AS | Performed by: PHYSICIAN ASSISTANT

## 2021-12-16 PROCEDURE — 0RG10AJ FUSION OF CERVICAL VERTEBRAL JOINT WITH INTERBODY FUSION DEVICE, POSTERIOR APPROACH, ANTERIOR COLUMN, OPEN APPROACH: ICD-10-PCS | Performed by: NEUROLOGICAL SURGERY

## 2021-12-16 PROCEDURE — 272N000001 HC OR GENERAL SUPPLY STERILE: Performed by: NEUROLOGICAL SURGERY

## 2021-12-16 PROCEDURE — 370N000017 HC ANESTHESIA TECHNICAL FEE, PER MIN: Performed by: NEUROLOGICAL SURGERY

## 2021-12-16 PROCEDURE — 22845 INSERT SPINE FIXATION DEVICE: CPT | Mod: AS | Performed by: PHYSICIAN ASSISTANT

## 2021-12-16 PROCEDURE — 250N000009 HC RX 250: Performed by: ANESTHESIOLOGY

## 2021-12-16 PROCEDURE — C1713 ANCHOR/SCREW BN/BN,TIS/BN: HCPCS | Performed by: NEUROLOGICAL SURGERY

## 2021-12-16 PROCEDURE — 00NW0ZZ RELEASE CERVICAL SPINAL CORD, OPEN APPROACH: ICD-10-PCS | Performed by: NEUROLOGICAL SURGERY

## 2021-12-16 PROCEDURE — 22585 ARTHRD ANT NTRBD MIN DSC EA: CPT | Mod: AS | Performed by: PHYSICIAN ASSISTANT

## 2021-12-16 PROCEDURE — 22554 ARTHRD ANT NTRBD MIN DSC CRV: CPT | Mod: 51 | Performed by: NEUROLOGICAL SURGERY

## 2021-12-16 PROCEDURE — 250N000009 HC RX 250: Performed by: NEUROLOGICAL SURGERY

## 2021-12-16 PROCEDURE — 22554 ARTHRD ANT NTRBD MIN DSC CRV: CPT | Mod: AS | Performed by: PHYSICIAN ASSISTANT

## 2021-12-16 PROCEDURE — 20936 SP BONE AGRFT LOCAL ADD-ON: CPT | Performed by: NEUROLOGICAL SURGERY

## 2021-12-16 PROCEDURE — 250N000013 HC RX MED GY IP 250 OP 250 PS 637: Performed by: NEUROLOGICAL SURGERY

## 2021-12-16 PROCEDURE — 710N000009 HC RECOVERY PHASE 1, LEVEL 1, PER MIN: Performed by: NEUROLOGICAL SURGERY

## 2021-12-16 PROCEDURE — 250N000011 HC RX IP 250 OP 636: Performed by: PHYSICIAN ASSISTANT

## 2021-12-16 PROCEDURE — L1499 SPINAL ORTHOSIS NOS: HCPCS

## 2021-12-16 PROCEDURE — 360N000084 HC SURGERY LEVEL 4 W/ FLUORO, PER MIN: Performed by: NEUROLOGICAL SURGERY

## 2021-12-16 PROCEDURE — 63081 REMOVE VERT BODY DCMPRN CRVL: CPT | Mod: AS | Performed by: PHYSICIAN ASSISTANT

## 2021-12-16 PROCEDURE — 4A1034Z MONITORING OF CENTRAL NERVOUS ELECTRICAL ACTIVITY, PERCUTANEOUS APPROACH: ICD-10-PCS | Performed by: NEUROLOGICAL SURGERY

## 2021-12-16 PROCEDURE — 22585 ARTHRD ANT NTRBD MIN DSC EA: CPT | Performed by: NEUROLOGICAL SURGERY

## 2021-12-16 PROCEDURE — 0RB30ZZ EXCISION OF CERVICAL VERTEBRAL DISC, OPEN APPROACH: ICD-10-PCS | Performed by: NEUROLOGICAL SURGERY

## 2021-12-16 PROCEDURE — 272N000282 HC OR IOM SUPPLIES OPNP: Performed by: NEUROLOGICAL SURGERY

## 2021-12-16 PROCEDURE — 36415 COLL VENOUS BLD VENIPUNCTURE: CPT | Performed by: NEUROLOGICAL SURGERY

## 2021-12-16 PROCEDURE — 20930 SP BONE ALGRFT MORSEL ADD-ON: CPT | Performed by: NEUROLOGICAL SURGERY

## 2021-12-16 PROCEDURE — 278N000051 HC OR IMPLANT GENERAL: Performed by: NEUROLOGICAL SURGERY

## 2021-12-16 PROCEDURE — 120N000001 HC R&B MED SURG/OB

## 2021-12-16 PROCEDURE — 250N000006 HC OR RX SURGIFLO W/THROMBIN KIT 2ML 1991 OPNP: Performed by: NEUROLOGICAL SURGERY

## 2021-12-16 DEVICE — IMPLANTABLE DEVICE: Type: IMPLANTABLE DEVICE | Site: SPINE CERVICAL | Status: FUNCTIONAL

## 2021-12-16 DEVICE — GRAFT BONE CRUSH CANC 15ML 400075: Type: IMPLANTABLE DEVICE | Site: SPINE CERVICAL | Status: FUNCTIONAL

## 2021-12-16 RX ORDER — FENTANYL CITRATE 0.05 MG/ML
25 INJECTION, SOLUTION INTRAMUSCULAR; INTRAVENOUS EVERY 5 MIN PRN
Status: DISCONTINUED | OUTPATIENT
Start: 2021-12-16 | End: 2021-12-16 | Stop reason: HOSPADM

## 2021-12-16 RX ORDER — LIDOCAINE 40 MG/G
CREAM TOPICAL
Status: DISCONTINUED | OUTPATIENT
Start: 2021-12-16 | End: 2021-12-18 | Stop reason: HOSPADM

## 2021-12-16 RX ORDER — CALCIUM CARBONATE 500 MG/1
500 TABLET, CHEWABLE ORAL 4 TIMES DAILY PRN
Status: DISCONTINUED | OUTPATIENT
Start: 2021-12-16 | End: 2021-12-18 | Stop reason: HOSPADM

## 2021-12-16 RX ORDER — POLYETHYLENE GLYCOL 3350 17 G/17G
17 POWDER, FOR SOLUTION ORAL DAILY
Status: DISCONTINUED | OUTPATIENT
Start: 2021-12-17 | End: 2021-12-18 | Stop reason: HOSPADM

## 2021-12-16 RX ORDER — ONDANSETRON 2 MG/ML
4 INJECTION INTRAMUSCULAR; INTRAVENOUS EVERY 30 MIN PRN
Status: DISCONTINUED | OUTPATIENT
Start: 2021-12-16 | End: 2021-12-16 | Stop reason: HOSPADM

## 2021-12-16 RX ORDER — METHOCARBAMOL 750 MG/1
750 TABLET, FILM COATED ORAL EVERY 6 HOURS
Status: DISCONTINUED | OUTPATIENT
Start: 2021-12-16 | End: 2021-12-18 | Stop reason: HOSPADM

## 2021-12-16 RX ORDER — OXYCODONE HYDROCHLORIDE 5 MG/1
10 TABLET ORAL EVERY 4 HOURS PRN
Status: DISCONTINUED | OUTPATIENT
Start: 2021-12-16 | End: 2021-12-18 | Stop reason: HOSPADM

## 2021-12-16 RX ORDER — ONDANSETRON 2 MG/ML
4 INJECTION INTRAMUSCULAR; INTRAVENOUS EVERY 6 HOURS PRN
Status: DISCONTINUED | OUTPATIENT
Start: 2021-12-16 | End: 2021-12-18 | Stop reason: HOSPADM

## 2021-12-16 RX ORDER — BISACODYL 10 MG
10 SUPPOSITORY, RECTAL RECTAL DAILY PRN
Status: DISCONTINUED | OUTPATIENT
Start: 2021-12-16 | End: 2021-12-18 | Stop reason: HOSPADM

## 2021-12-16 RX ORDER — NALOXONE HYDROCHLORIDE 0.4 MG/ML
0.4 INJECTION, SOLUTION INTRAMUSCULAR; INTRAVENOUS; SUBCUTANEOUS
Status: DISCONTINUED | OUTPATIENT
Start: 2021-12-16 | End: 2021-12-18 | Stop reason: HOSPADM

## 2021-12-16 RX ORDER — ACETAMINOPHEN 325 MG/1
650 TABLET ORAL EVERY 4 HOURS PRN
Status: DISCONTINUED | OUTPATIENT
Start: 2021-12-19 | End: 2021-12-18 | Stop reason: HOSPADM

## 2021-12-16 RX ORDER — ERGOCALCIFEROL 1.25 MG/1
50000 CAPSULE, LIQUID FILLED ORAL
Status: DISCONTINUED | OUTPATIENT
Start: 2021-12-16 | End: 2021-12-18 | Stop reason: HOSPADM

## 2021-12-16 RX ORDER — HYDROMORPHONE HCL IN WATER/PF 6 MG/30 ML
0.2 PATIENT CONTROLLED ANALGESIA SYRINGE INTRAVENOUS EVERY 5 MIN PRN
Status: DISCONTINUED | OUTPATIENT
Start: 2021-12-16 | End: 2021-12-16 | Stop reason: HOSPADM

## 2021-12-16 RX ORDER — NALOXONE HYDROCHLORIDE 0.4 MG/ML
0.2 INJECTION, SOLUTION INTRAMUSCULAR; INTRAVENOUS; SUBCUTANEOUS
Status: DISCONTINUED | OUTPATIENT
Start: 2021-12-16 | End: 2021-12-18 | Stop reason: HOSPADM

## 2021-12-16 RX ORDER — ONDANSETRON 4 MG/1
4 TABLET, ORALLY DISINTEGRATING ORAL EVERY 30 MIN PRN
Status: DISCONTINUED | OUTPATIENT
Start: 2021-12-16 | End: 2021-12-16 | Stop reason: HOSPADM

## 2021-12-16 RX ORDER — PROPOFOL 10 MG/ML
INJECTION, EMULSION INTRAVENOUS PRN
Status: DISCONTINUED | OUTPATIENT
Start: 2021-12-16 | End: 2021-12-16

## 2021-12-16 RX ORDER — BUPIVACAINE HYDROCHLORIDE AND EPINEPHRINE 2.5; 5 MG/ML; UG/ML
INJECTION, SOLUTION INFILTRATION; PERINEURAL PRN
Status: DISCONTINUED | OUTPATIENT
Start: 2021-12-16 | End: 2021-12-16 | Stop reason: HOSPADM

## 2021-12-16 RX ORDER — DEXAMETHASONE SODIUM PHOSPHATE 4 MG/ML
INJECTION, SOLUTION INTRA-ARTICULAR; INTRALESIONAL; INTRAMUSCULAR; INTRAVENOUS; SOFT TISSUE PRN
Status: DISCONTINUED | OUTPATIENT
Start: 2021-12-16 | End: 2021-12-16

## 2021-12-16 RX ORDER — ACETAMINOPHEN 325 MG/1
975 TABLET ORAL EVERY 8 HOURS
Status: DISCONTINUED | OUTPATIENT
Start: 2021-12-16 | End: 2021-12-18 | Stop reason: HOSPADM

## 2021-12-16 RX ORDER — CEFAZOLIN SODIUM IN 0.9 % NACL 3 G/100 ML
3 INTRAVENOUS SOLUTION, PIGGYBACK (ML) INTRAVENOUS
Status: DISCONTINUED | OUTPATIENT
Start: 2021-12-16 | End: 2021-12-16 | Stop reason: HOSPADM

## 2021-12-16 RX ORDER — EPHEDRINE SULFATE 50 MG/ML
INJECTION, SOLUTION INTRAMUSCULAR; INTRAVENOUS; SUBCUTANEOUS PRN
Status: DISCONTINUED | OUTPATIENT
Start: 2021-12-16 | End: 2021-12-16

## 2021-12-16 RX ORDER — CEFAZOLIN SODIUM 2 G/100ML
2 INJECTION, SOLUTION INTRAVENOUS SEE ADMIN INSTRUCTIONS
Status: DISCONTINUED | OUTPATIENT
Start: 2021-12-16 | End: 2021-12-16 | Stop reason: HOSPADM

## 2021-12-16 RX ORDER — HYDROMORPHONE HCL IN WATER/PF 6 MG/30 ML
0.2 PATIENT CONTROLLED ANALGESIA SYRINGE INTRAVENOUS
Status: DISCONTINUED | OUTPATIENT
Start: 2021-12-16 | End: 2021-12-18 | Stop reason: HOSPADM

## 2021-12-16 RX ORDER — SODIUM CHLORIDE, SODIUM LACTATE, POTASSIUM CHLORIDE, CALCIUM CHLORIDE 600; 310; 30; 20 MG/100ML; MG/100ML; MG/100ML; MG/100ML
INJECTION, SOLUTION INTRAVENOUS CONTINUOUS
Status: DISCONTINUED | OUTPATIENT
Start: 2021-12-16 | End: 2021-12-16 | Stop reason: HOSPADM

## 2021-12-16 RX ORDER — PROCHLORPERAZINE MALEATE 5 MG
10 TABLET ORAL EVERY 6 HOURS PRN
Status: DISCONTINUED | OUTPATIENT
Start: 2021-12-16 | End: 2021-12-18 | Stop reason: HOSPADM

## 2021-12-16 RX ORDER — PROPOFOL 10 MG/ML
INJECTION, EMULSION INTRAVENOUS CONTINUOUS PRN
Status: DISCONTINUED | OUTPATIENT
Start: 2021-12-16 | End: 2021-12-16

## 2021-12-16 RX ORDER — ACETAMINOPHEN 325 MG/1
975 TABLET ORAL ONCE
Status: COMPLETED | OUTPATIENT
Start: 2021-12-16 | End: 2021-12-16

## 2021-12-16 RX ORDER — CEFAZOLIN SODIUM 2 G/100ML
2 INJECTION, SOLUTION INTRAVENOUS
Status: COMPLETED | OUTPATIENT
Start: 2021-12-16 | End: 2021-12-16

## 2021-12-16 RX ORDER — ONDANSETRON 4 MG/1
4 TABLET, ORALLY DISINTEGRATING ORAL EVERY 6 HOURS PRN
Status: DISCONTINUED | OUTPATIENT
Start: 2021-12-16 | End: 2021-12-18 | Stop reason: HOSPADM

## 2021-12-16 RX ORDER — HYDROMORPHONE HCL IN WATER/PF 6 MG/30 ML
0.4 PATIENT CONTROLLED ANALGESIA SYRINGE INTRAVENOUS
Status: DISCONTINUED | OUTPATIENT
Start: 2021-12-16 | End: 2021-12-18 | Stop reason: HOSPADM

## 2021-12-16 RX ORDER — HYDROXYZINE HYDROCHLORIDE 25 MG/1
25 TABLET, FILM COATED ORAL EVERY 6 HOURS PRN
Status: DISCONTINUED | OUTPATIENT
Start: 2021-12-16 | End: 2021-12-18 | Stop reason: HOSPADM

## 2021-12-16 RX ORDER — OXYCODONE HYDROCHLORIDE 5 MG/1
5 TABLET ORAL EVERY 4 HOURS PRN
Status: DISCONTINUED | OUTPATIENT
Start: 2021-12-16 | End: 2021-12-18 | Stop reason: HOSPADM

## 2021-12-16 RX ORDER — FENTANYL CITRATE 50 UG/ML
INJECTION, SOLUTION INTRAMUSCULAR; INTRAVENOUS PRN
Status: DISCONTINUED | OUTPATIENT
Start: 2021-12-16 | End: 2021-12-16

## 2021-12-16 RX ORDER — FENTANYL CITRATE 0.05 MG/ML
50 INJECTION, SOLUTION INTRAMUSCULAR; INTRAVENOUS
Status: DISCONTINUED | OUTPATIENT
Start: 2021-12-16 | End: 2021-12-16 | Stop reason: HOSPADM

## 2021-12-16 RX ORDER — ONDANSETRON 2 MG/ML
INJECTION INTRAMUSCULAR; INTRAVENOUS PRN
Status: DISCONTINUED | OUTPATIENT
Start: 2021-12-16 | End: 2021-12-16

## 2021-12-16 RX ORDER — TRIAMCINOLONE ACETONIDE 1 MG/G
OINTMENT TOPICAL 2 TIMES DAILY PRN
Status: DISCONTINUED | OUTPATIENT
Start: 2021-12-16 | End: 2021-12-18 | Stop reason: HOSPADM

## 2021-12-16 RX ORDER — LIDOCAINE 40 MG/G
CREAM TOPICAL
Status: DISCONTINUED | OUTPATIENT
Start: 2021-12-16 | End: 2021-12-16 | Stop reason: HOSPADM

## 2021-12-16 RX ORDER — LIDOCAINE HYDROCHLORIDE 20 MG/ML
INJECTION, SOLUTION INFILTRATION; PERINEURAL PRN
Status: DISCONTINUED | OUTPATIENT
Start: 2021-12-16 | End: 2021-12-16

## 2021-12-16 RX ORDER — AMOXICILLIN 250 MG
1 CAPSULE ORAL 2 TIMES DAILY
Status: DISCONTINUED | OUTPATIENT
Start: 2021-12-16 | End: 2021-12-18 | Stop reason: HOSPADM

## 2021-12-16 RX ADMIN — DEXMEDETOMIDINE HYDROCHLORIDE 8 MCG: 100 INJECTION, SOLUTION INTRAVENOUS at 12:35

## 2021-12-16 RX ADMIN — REMIFENTANIL HYDROCHLORIDE 0.2 MCG/KG/MIN: 1 INJECTION, POWDER, LYOPHILIZED, FOR SOLUTION INTRAVENOUS at 10:15

## 2021-12-16 RX ADMIN — DEXMEDETOMIDINE HYDROCHLORIDE 12 MCG: 100 INJECTION, SOLUTION INTRAVENOUS at 10:41

## 2021-12-16 RX ADMIN — SENNOSIDES AND DOCUSATE SODIUM 1 TABLET: 50; 8.6 TABLET ORAL at 20:43

## 2021-12-16 RX ADMIN — SUCCINYLCHOLINE CHLORIDE 140 MG: 20 INJECTION, SOLUTION INTRAMUSCULAR; INTRAVENOUS; PARENTERAL at 10:11

## 2021-12-16 RX ADMIN — ACETAMINOPHEN 975 MG: 325 TABLET, FILM COATED ORAL at 08:49

## 2021-12-16 RX ADMIN — HYDROMORPHONE HYDROCHLORIDE 0.2 MG: 0.2 INJECTION, SOLUTION INTRAMUSCULAR; INTRAVENOUS; SUBCUTANEOUS at 13:51

## 2021-12-16 RX ADMIN — PROPOFOL 100 MCG/KG/MIN: 10 INJECTION, EMULSION INTRAVENOUS at 10:15

## 2021-12-16 RX ADMIN — FENTANYL CITRATE 25 MCG: 50 INJECTION, SOLUTION INTRAMUSCULAR; INTRAVENOUS at 13:22

## 2021-12-16 RX ADMIN — HYDROMORPHONE HYDROCHLORIDE 0.2 MG: 0.2 INJECTION, SOLUTION INTRAMUSCULAR; INTRAVENOUS; SUBCUTANEOUS at 13:36

## 2021-12-16 RX ADMIN — DEXAMETHASONE SODIUM PHOSPHATE 4 MG: 4 INJECTION, SOLUTION INTRA-ARTICULAR; INTRALESIONAL; INTRAMUSCULAR; INTRAVENOUS; SOFT TISSUE at 10:21

## 2021-12-16 RX ADMIN — Medication 1 LOZENGE: at 18:40

## 2021-12-16 RX ADMIN — ERGOCALCIFEROL 50000 UNITS: 1.25 CAPSULE, LIQUID FILLED ORAL at 19:05

## 2021-12-16 RX ADMIN — HYDROMORPHONE HYDROCHLORIDE 0.5 MG: 1 INJECTION, SOLUTION INTRAMUSCULAR; INTRAVENOUS; SUBCUTANEOUS at 12:28

## 2021-12-16 RX ADMIN — ACETAMINOPHEN 975 MG: 325 TABLET, FILM COATED ORAL at 16:43

## 2021-12-16 RX ADMIN — CEFAZOLIN SODIUM 3 G: 2 INJECTION, SOLUTION INTRAVENOUS at 10:02

## 2021-12-16 RX ADMIN — PROPOFOL 250 MG: 10 INJECTION, EMULSION INTRAVENOUS at 10:11

## 2021-12-16 RX ADMIN — FENTANYL CITRATE 100 MCG: 50 INJECTION, SOLUTION INTRAMUSCULAR; INTRAVENOUS at 10:11

## 2021-12-16 RX ADMIN — METHOCARBAMOL 750 MG: 750 TABLET ORAL at 18:39

## 2021-12-16 RX ADMIN — HYDROMORPHONE HYDROCHLORIDE 0.2 MG: 0.2 INJECTION, SOLUTION INTRAMUSCULAR; INTRAVENOUS; SUBCUTANEOUS at 13:41

## 2021-12-16 RX ADMIN — FENTANYL CITRATE 25 MCG: 50 INJECTION, SOLUTION INTRAMUSCULAR; INTRAVENOUS at 13:27

## 2021-12-16 RX ADMIN — OXYCODONE HYDROCHLORIDE 5 MG: 5 TABLET ORAL at 20:44

## 2021-12-16 RX ADMIN — PHENYLEPHRINE HYDROCHLORIDE 100 MCG: 10 INJECTION INTRAVENOUS at 11:29

## 2021-12-16 RX ADMIN — SODIUM CHLORIDE, POTASSIUM CHLORIDE, SODIUM LACTATE AND CALCIUM CHLORIDE: 600; 310; 30; 20 INJECTION, SOLUTION INTRAVENOUS at 11:32

## 2021-12-16 RX ADMIN — LIDOCAINE HYDROCHLORIDE 100 MG: 20 INJECTION, SOLUTION INFILTRATION; PERINEURAL at 10:11

## 2021-12-16 RX ADMIN — SODIUM CHLORIDE, POTASSIUM CHLORIDE, SODIUM LACTATE AND CALCIUM CHLORIDE: 600; 310; 30; 20 INJECTION, SOLUTION INTRAVENOUS at 08:53

## 2021-12-16 RX ADMIN — MIDAZOLAM 2 MG: 1 INJECTION INTRAMUSCULAR; INTRAVENOUS at 10:02

## 2021-12-16 RX ADMIN — ONDANSETRON 4 MG: 2 INJECTION INTRAMUSCULAR; INTRAVENOUS at 12:30

## 2021-12-16 RX ADMIN — Medication 5 MG: at 10:23

## 2021-12-16 RX ADMIN — HYDROMORPHONE HYDROCHLORIDE 0.2 MG: 0.2 INJECTION, SOLUTION INTRAMUSCULAR; INTRAVENOUS; SUBCUTANEOUS at 16:46

## 2021-12-16 ASSESSMENT — ACTIVITIES OF DAILY LIVING (ADL)
ADLS_ACUITY_SCORE: 10
ADLS_ACUITY_SCORE: 5
ADLS_ACUITY_SCORE: 3

## 2021-12-16 ASSESSMENT — MIFFLIN-ST. JEOR: SCORE: 2099.11

## 2021-12-16 NOTE — PROGRESS NOTES
POST OP NOTE:     POD0 s/p right C5 corpectomy with cage reconstruction, plating, arthrodesis using local autograft. No intraoperative complications. EBL 200ml. Of note, only 3 screws in plate.       PLAN:   -Admit to floor status   -C spine XR tomorrow AM   -PT nad OT consults  -Brace to be worn when out of bed x 6 weeks time. Orthotics has been consulted  -Pain management  -No NSAIDs  -Plans reviewed with Dr. Lees  -Page on call provider with questions

## 2021-12-16 NOTE — BRIEF OP NOTE
[facility]    Brief Operative Note    Pre-operative diagnosis: Stenosis of cervical spine with myelopathy (H) [M48.02, G99.2]  Post-operative diagnosis Same as pre-operative diagnosis    Procedure: Procedure(s):  Right Cervical 5 corpectomy with cage reconstruction, plating, arthrodesis using local autograft. Swanson [place] tong placement  Surgeon: Surgeon(s) and Role:     * Aly Lees MD - Primary     * Ela Jurado PA-C - Assisting  Anesthesia: General   Estimated Blood Loss: 200 ml    Drains: None  Specimens: * No specimens in log *  Findings:   None.  Complications: None.  Implants:   Implant Name Type Inv. Item Serial No.  Lot No. LRB No. Used Action   GRAFT BONE CRUSH CANC 15ML [id number] - [id number] Bone/Tissue/Biologic GRAFT BONE CRUSH CANC 15ML [id number] MUSCULOSKELETAL CALLEJAS  Right 1 Implanted   26MM PARALLEL BENGAL CAGE    SYNTHES [id number] Right 1 Implanted   IMP SCR SYN CERVICAL 4.0X14MM VA TI SELF DRILL 04.613.514 - [id number] Metallic Hardware/[place] IMP SCR SYN CERVICAL 4.0X14MM VA TI SELF DRILL 04.613.514  SYNTHES-STRATEC [id number] Right 2 Implanted   IMP SCR SYN CERVICAL 4.0X14MM VA TI SELF DRILL 04.613.514 - [id number] Metallic Hardware/[place] IMP SCR SYN CERVICAL 4.0X14MM VA TI SELF DRILL 04.613.514  SYNTHES-STRATEC [id number] Right 2 Wasted   IMP SCR SYN CERVICAL 4.5X16MM VA TI SELF TAP - [id number] Metallic Hardware/[place] IMP SCR SYN CERVICAL 4.5X16MM VA TI SELF TAP  SYNTHES-STRATEC [id number] Right 1 Implanted   IMP SCR SYN CERVICAL 4.5X16MM VA TI SELF TAP - [id number] Metallic Hardware/[place] IMP SCR SYN CERVICAL 4.5X16MM VA TI SELF TAP  SYNTHES-STRATEC  Right 1 Wasted   IMP PLATE SYN VECTRA LEVEL 2 32MM TI 04.613.132 - [id number] Metallic Hardware/[place] IMP PLATE SYN VECTRA LEVEL 2 32MM TI 04.613.132  SYNTHES-STRATEC [id number] Right 1 Implanted     [id number]

## 2021-12-16 NOTE — PROGRESS NOTES
S: Patient was seen today at 2417-01 for an eval for a Aspen cervical collar as ordered.    O:  Pt was fit with a [place] cervical collar. The anterior section was donned. The posterior section was donned and fastened to the front using the velcro straps. Attention was given to the chin support being sure that the correct height was set to support the chin. An extra padding set was also provided. Patient instructed on donning, doffing, use and care    A: An Aspen collar was provided and fit as required.     P: Patient will wear the collar at all times following Physician guidelines. Patient has been instructed to contact our facility with any questions and/or concerns    G: The objective/goal of the collar is to help reduce motion/give cervical stability.    Mateusz MORENO

## 2021-12-16 NOTE — ANESTHESIA PREPROCEDURE EVALUATION
Anesthesia Pre-Procedure Evaluation    Patient: Pernell Martinez   MRN: [id number] : 1970        Preoperative Diagnosis: Stenosis of cervical spine with myelopathy (H) [M48.02, G99.2]    Procedure : Procedure(s):  Right Cervical 5 corpectomy with cage reconstruction, plating, arthrodesis using local autograft. Swanson Wells tong placement          Past Medical History:   Diagnosis Date     GERD (gastroesophageal reflux disease)      Obese      Psoriasis       Past Surgical History:   Procedure Laterality Date     COLONOSCOPY        Allergies   Allergen Reactions     Cats      Other reaction(s): Sneezing     Dogs      Other reaction(s): Sneezing     Hepatitis B Virus Vaccine Other (See Comments)     HEPATITIS LIKE SYMPTOMS     Other Environmental Allergy      Seasonal allergies     Nickel Rash      Social History     Tobacco Use     Smoking status: Never Smoker     Smokeless tobacco: Never Used   Substance Use Topics     Alcohol use: Not on file     Comment: rare      Wt Readings from Last 1 Encounters:   21 120.6 kg (265 lb 14.4 oz)        Anesthesia Evaluation   Pt has had prior anesthetic.     No history of anesthetic complications       ROS/MED HX  ENT/Pulmonary:  - neg pulmonary ROS     Neurologic:  - neg neurologic ROS  (-) Delerium   Cardiovascular:  - neg cardiovascular ROS     METS/Exercise Tolerance:     Hematologic:  - neg hematologic  ROS     Musculoskeletal:  - neg musculoskeletal ROS     GI/Hepatic:     (+) GERD,     Renal/Genitourinary:  - neg Renal ROS     Endo: Comment: Impaired fasting glucose.    (+) Obesity,     Psychiatric/Substance Use:  - neg psychiatric ROS     Infectious Disease:  - neg infectious disease ROS     Malignancy:  - neg malignancy ROS     Other:            Physical Exam    Airway  airway exam normal      Mallampati: II   TM distance: > 3 FB   Neck ROM: full   Mouth opening: > 3 cm    Respiratory Devices and Support         Dental  no notable dental history          Cardiovascular   cardiovascular exam normal          Pulmonary   pulmonary exam normal                OUTSIDE LABS:  CBC: No results found for: WBC, HGB, HCT, PLT  BMP: No results found for: NA, POTASSIUM, CHLORIDE, CO2, BUN, CR, GLC  COAGS: No results found for: PTT, INR, FIBR  POC: No results found for: BGM, HCG, HCGS  HEPATIC: No results found for: ALBUMIN, PROTTOTAL, ALT, AST, GGT, ALKPHOS, BILITOTAL, BILIDIRECT, CROW  OTHER: No results found for: PH, LACT, A1C, VALENTIN, PHOS, MAG, LIPASE, AMYLASE, TSH, T4, T3, CRP, SED    Anesthesia Plan    ASA Status:  2      Anesthesia Type: General.     - Airway: ETT   Induction: Intravenous.   Maintenance: Balanced.   Techniques and Equipment:     - Airway: Video-Laryngoscope     - Lines/Monitors: Arterial Line     Consents    Anesthesia Plan(s) and associated risks, benefits, and realistic alternatives discussed. Questions answered and patient/representative(s) expressed understanding.    - Discussed:     - Discussed with:  Patient         Postoperative Care    Pain management: IV analgesics, Multi-modal analgesia.   PONV prophylaxis: Ondansetron (or other 5HT-3), Dexamethasone or Solumedrol     Comments:                Srinivas Flaherty MD

## 2021-12-16 NOTE — PROGRESS NOTES
Patient's wife met him in his room upon transport to [facility].   Patient arrived without incident.

## 2021-12-16 NOTE — ANESTHESIA PROCEDURE NOTES
Airway       Patient location during procedure: OR       Procedure Start/Stop Times: 12/16/2021 10:13 AM  Staff -        Anesthesiologist:  Srinivas Flaherty MD       CRNA: Sri Bradshaw       Other Anesthesia Staff: Demarco Valencia       Performed By: SRNA  Consent for Airway        Urgency: elective  Indications and Patient Condition       Indications for airway management: shavonne-procedural       Induction type:intravenous       Mask difficulty assessment: 2 - vent by mask + OA or adjuvant +/- NMBA    Final Airway Details       Final airway type: endotracheal airway       Successful airway: ETT - single  Endotracheal Airway Details        ETT size (mm): 8.0       Cuffed: yes       Successful intubation technique: video laryngoscopy       VL Blade Size: Glidescope 4       Grade View of Cords: 1       Adjucts: stylet       Position: Left       Measured from: lips       Secured at (cm): 24       Bite block used: Soft    Post intubation assessment        Placement verified by: capnometry, equal breath sounds and chest rise        Number of attempts at approach: 1       Number of other approaches attempted: 0       Secured with: pink tape       Ease of procedure: easy       Dentition: Unchanged

## 2021-12-16 NOTE — ANESTHESIA POSTPROCEDURE EVALUATION
Patient: Pernell Martinez    Procedure: Procedure(s):  Right Cervical 5 corpectomy with cage reconstruction, plating, arthrodesis using local autograft. Swanson [place] tong placement       Diagnosis:Stenosis of cervical spine with myelopathy (H) [M48.02, G99.2]  Diagnosis Additional Information: No value filed.    Anesthesia Type:  General    Note:     Postop Pain Control: Uneventful            Sign Out: Well controlled pain   PONV: No   Neuro/Psych: Uneventful            Sign Out: Acceptable/Baseline neuro status   Airway/Respiratory: Uneventful            Sign Out: Acceptable/Baseline resp. status   CV/Hemodynamics: Uneventful            Sign Out: Acceptable CV status; No obvious hypovolemia; No obvious fluid overload   Other NRE: NONE   DID A NON-ROUTINE EVENT OCCUR? No           Last vitals:  Vitals Value Taken Time   /73 12/16/21 1430   Temp 37.1  C (98.7  F) 12/16/21 1340   Pulse 93 12/16/21 1432   Resp 26 12/16/21 1432   SpO2 97 % 12/16/21 1432   Vitals shown include unvalidated device data.    Electronically Signed By: Rajesh Ram MD  December 16, 2021  4:17 PM

## 2021-12-16 NOTE — ANESTHESIA CARE TRANSFER NOTE
Patient: Pernell Martinez    Procedure: Procedure(s):  Right Cervical 5 corpectomy with cage reconstruction, plating, arthrodesis using local autograft. Swanson Wells tong placement       Diagnosis: Stenosis of cervical spine with myelopathy (H) [M48.02, G99.2]  Diagnosis Additional Information: No value filed.    Anesthesia Type:   General     Note:    Oropharynx: oropharynx clear of all foreign objects and spontaneously breathing  Level of Consciousness: awake  Oxygen Supplementation: face mask  Level of Supplemental Oxygen (L/min / FiO2): 6  Independent Airway: airway patency satisfactory and stable  Dentition: dentition unchanged  Vital Signs Stable: post-procedure vital signs reviewed and stable  Report to RN Given: handoff report given  Patient transferred to: PACU  Comments: Neuromuscular blockade not used after succinylcholine for intubation, spontaneous return of TOF 4/4 with sustained tetany, spontaneous respirations, adequate tidal volumes, followed commands to voice, oropharynx suctioned with soft flexible catheter, extubated atraumatically, extubated with suction, airway patent after extubation.  Oxygen via facemask at 6 liters per minute to PACU. Oxygen tubing connected to wall O2 in PACU, SpO2, NiBP, and EKG monitors and alarms on and functioning, Kuldeep Hugger warmer connected to patient gown, report on patient's clinical status given to PACU RN, RN questions answered.     Handoff Report: Identifed the Patient, Identified the Reponsible Provider, Reviewed the pertinent medical history, Discussed the surgical course, Reviewed Intra-OP anesthesia mangement and issues during anesthesia, Set expectations for post-procedure period and Allowed opportunity for questions and acknowledgement of understanding      Vitals:  Vitals Value Taken Time   /81 12/16/21 1304   Temp     Pulse 89 12/16/21 1307   Resp 0 12/16/21 1307   SpO2 98 % 12/16/21 1307   Vitals shown include unvalidated device  data.    Electronically Signed By: Sri Bradshaw  December 16, 2021  1:08 PM

## 2021-12-16 NOTE — ANESTHESIA PROCEDURE NOTES
Arterial Line Procedure Note    Pre-Procedure   Staff -        Anesthesiologist:  Blaze Garcia MD       Performed By: Anesthesiologist       Location: pre-op       Pre-Anesthestic Checklist: patient identified, IV checked, risks and benefits discussed, informed consent, monitors and equipment checked and pre-op evaluation  Timeout:       Correct Patient: Yes        Correct Procedure: Yes        Correct Site: Yes        Correct Position: Yes   Procedure   Procedure: arterial line       Diagnosis: Hemodynamic instability       Laterality: right       Insertion Site: radial.  Sterile Prep        All elements of maximal sterile barrier technique followed       Skin prep: Chloraprep  Insertion/Injection        Technique: ultrasound guided        1. Ultrasound was used to evaluate the access site.       2. Artery evaluated via ultrasound for patency/adequacy.       3. Using real-time ultrasound the needle/catheter was observed entering the artery/vein.       4. Permanent image was captured and entered into the patient's record.       Catheter Type/Size: 20 gauge, 1.75 in/4.5 cm quick cath (integral wire)  Narrative         Secured by: other       Tegaderm dressing used.       Complications: None apparent,      Arterial waveform: Yes    Comments:  Ultrasound Interpretation arterial catheter    1. Ultrasound guidance was used to evaluate potential access sites.  2. Ultrasound was also used to verify the patency of the vessel specified above.   3. Ultrasound was used to visualize the needle entering the vessel.   4. The visualized structures were anatomically normal.  5. There were no apparent abnormal pathological findings.  6. A permanent ultrasound image was saved in the patient's record.

## 2021-12-17 ENCOUNTER — APPOINTMENT (OUTPATIENT)
Dept: PHYSICAL THERAPY | Facility: CLINIC | Age: 51
DRG: 472 | End: 2021-12-17
Attending: PHYSICIAN ASSISTANT
Payer: COMMERCIAL

## 2021-12-17 ENCOUNTER — APPOINTMENT (OUTPATIENT)
Dept: GENERAL RADIOLOGY | Facility: CLINIC | Age: 51
DRG: 472 | End: 2021-12-17
Attending: PHYSICIAN ASSISTANT
Payer: COMMERCIAL

## 2021-12-17 ENCOUNTER — TELEPHONE (OUTPATIENT)
Dept: NEUROSURGERY | Facility: CLINIC | Age: 51
End: 2021-12-17
Payer: COMMERCIAL

## 2021-12-17 LAB
GLUCOSE BLD-MCNC: 115 MG/DL (ref 70–99)
HGB BLD-MCNC: 13.3 G/DL (ref 13.3–17.7)

## 2021-12-17 PROCEDURE — 82947 ASSAY GLUCOSE BLOOD QUANT: CPT | Performed by: NEUROLOGICAL SURGERY

## 2021-12-17 PROCEDURE — 97116 GAIT TRAINING THERAPY: CPT | Mod: GP

## 2021-12-17 PROCEDURE — 85018 HEMOGLOBIN: CPT | Performed by: PHYSICIAN ASSISTANT

## 2021-12-17 PROCEDURE — 97161 PT EVAL LOW COMPLEX 20 MIN: CPT | Mod: GP

## 2021-12-17 PROCEDURE — 999N000065 XR CERVICAL SPINE 2/3 VWS

## 2021-12-17 PROCEDURE — 250N000013 HC RX MED GY IP 250 OP 250 PS 637: Performed by: PHYSICIAN ASSISTANT

## 2021-12-17 PROCEDURE — 36415 COLL VENOUS BLD VENIPUNCTURE: CPT | Performed by: PHYSICIAN ASSISTANT

## 2021-12-17 PROCEDURE — 120N000001 HC R&B MED SURG/OB

## 2021-12-17 RX ORDER — METHOCARBAMOL 750 MG/1
750 TABLET, FILM COATED ORAL EVERY 6 HOURS
Qty: 40 TABLET | Refills: 0 | Status: SHIPPED | OUTPATIENT
Start: 2021-12-17 | End: 2022-01-05

## 2021-12-17 RX ORDER — OXYCODONE HYDROCHLORIDE 5 MG/1
5 TABLET ORAL EVERY 6 HOURS PRN
Qty: 40 TABLET | Refills: 0 | Status: SHIPPED | OUTPATIENT
Start: 2021-12-17 | End: 2022-01-05

## 2021-12-17 RX ORDER — AMOXICILLIN 250 MG
1 CAPSULE ORAL 2 TIMES DAILY PRN
Qty: 40 TABLET | Refills: 0 | Status: SHIPPED | OUTPATIENT
Start: 2021-12-17 | End: 2022-01-05

## 2021-12-17 RX ADMIN — Medication 1 LOZENGE: at 03:15

## 2021-12-17 RX ADMIN — Medication 1 LOZENGE: at 11:39

## 2021-12-17 RX ADMIN — Medication 1 LOZENGE: at 00:25

## 2021-12-17 RX ADMIN — METHOCARBAMOL 750 MG: 750 TABLET ORAL at 00:13

## 2021-12-17 RX ADMIN — ACETAMINOPHEN 975 MG: 325 TABLET, FILM COATED ORAL at 15:46

## 2021-12-17 RX ADMIN — Medication 1 LOZENGE: at 16:16

## 2021-12-17 RX ADMIN — METHOCARBAMOL 750 MG: 750 TABLET ORAL at 05:07

## 2021-12-17 RX ADMIN — POLYETHYLENE GLYCOL 3350 17 G: 17 POWDER, FOR SOLUTION ORAL at 08:52

## 2021-12-17 RX ADMIN — ACETAMINOPHEN 975 MG: 325 TABLET, FILM COATED ORAL at 00:13

## 2021-12-17 RX ADMIN — Medication 1 LOZENGE: at 10:26

## 2021-12-17 RX ADMIN — METHOCARBAMOL 750 MG: 750 TABLET ORAL at 18:23

## 2021-12-17 RX ADMIN — SENNOSIDES AND DOCUSATE SODIUM 1 TABLET: 50; 8.6 TABLET ORAL at 08:52

## 2021-12-17 RX ADMIN — SENNOSIDES AND DOCUSATE SODIUM 1 TABLET: 50; 8.6 TABLET ORAL at 21:43

## 2021-12-17 RX ADMIN — ACETAMINOPHEN 975 MG: 325 TABLET, FILM COATED ORAL at 08:52

## 2021-12-17 RX ADMIN — METHOCARBAMOL 750 MG: 750 TABLET ORAL at 11:34

## 2021-12-17 ASSESSMENT — ACTIVITIES OF DAILY LIVING (ADL)
ADLS_ACUITY_SCORE: 5
ADLS_ACUITY_SCORE: 3
ADLS_ACUITY_SCORE: 5
ADLS_ACUITY_SCORE: 3
ADLS_ACUITY_SCORE: 5

## 2021-12-17 NOTE — PROGRESS NOTES
Pt update [id number]:    VSS. Capno in place. Aspen collar to be worn when OOB. Surgical incision CDI, open to air. Numbness in hands and feet, this is baseline for the pt. Ambulating w SBA, gait belt. Plan to discharge home tmrw.

## 2021-12-17 NOTE — PLAN OF CARE
Pt A&Ox4, voiding per elinor, VSS, aspen collar to be worn when OOB. Surgical incision CDI, open to air. Baseline numbness in both hands and feet. Ambulating w SBA, GB&W. Plan to discharge home tmrw.

## 2021-12-17 NOTE — PROGRESS NOTES
52 y/o Male patient  Stenosis of cervical spine with myelopathy.  Voided: 950CC 1500-1900pm  Chang cath in place. Not completely study n feet but is considering getting taken out tomorrow.  VS:stable, on 24 hr Capno obs  Using orthop neck brace when out of bed  CNS: intact  Strength: Strong  Pain: averaging between 2-4 out of 10  Methocarbonal taken at 1830 pm  Tylenol at 1643pm  Dilaudid injection 0.2 given at 1646pm    Last VS and check at 19:00- patient is comfortable and tolerating soft liquid diet. Simple soup and mash potatoes. Swallows ok.     Check vitals from 9034-4454 for details.    Yue ANTHONY

## 2021-12-17 NOTE — PROGRESS NOTES
"Patient vital signs are at baseline: Yes  Patient able to ambulate as they were prior to admission or with assist devices provided by therapies during their stay:  Yes  Patient MUST void prior to discharge:  Yes  Patient able to tolerate oral intake:  Yes  Pain has adequate pain control using Oral analgesics:  Yes, scheduled Tylenol and Robaxin.    Chang pulled this morning, patient up to bathroom voiding spontaneously and reports \"feeling empty\" post void. Patient passing gas, no BM. Incision SORAYA, with no s/s infection or concern. Pt showered with staff this morning, pads changed to Aspen collar. Writer did education with wife on how to clean & change pads out. Slight soreness reported to throat, managed with PRN lozenges with good effect. Patient and wife would like to stay additional night for therapy session in AM. Discharge plans for tomorrow. Will continue to monitor.  "

## 2021-12-17 NOTE — OP NOTE
Procedure Date: 12/16/2021    PREOPERATIVE DIAGNOSIS:  Cervical stenosis with myelopathy.    POSTOPERATIVE DIAGNOSIS:  Cervical stenosis with myelopathy.    PROCEDURE:    1.  Right C5 corpectomy with cage reconstruction and plating, C4-C6 with anterior arthrodesis using local autograft and allograft cancellous chips.  2.  Swanson-Wells tong placement.    SURGEON:  Aly Lees MD    ASSISTANT:  Ela Jurado PA-C    ANESTHESIA:  General endotracheal anesthesia.    ESTIMATED BLOOD LOSS:  200 mL    INDICATIONS FOR PROCEDURE:  The patient is a 51-year-old male with progressive numbness and tingling of his extremities and weakness and balance difficulties.  MRI of the cervical spine demonstrated a large herniated disk at C4-C5 with extension behind the C4 vertebral body as well as to a lesser degree a left herniated disk at C5-C6.  Given the extensive nature of these and extension behind the vertebral body of C4, a corpectomy was planned in order to decompress and stabilize the spine. Please note that Ela Jurado PA-C's assistance was needed for positioning, retraction, suctioning, and closure.     DESCRIPTION OF PROCEDURE:  The patient was brought to the operating room.  General endotracheal anesthesia was induced.  The patient was positioned supine with the head slightly extended.  Swanson-Wells tongs were placed and 10 pounds of traction instituted.  Neuromonitoring was begun and monitorable signals were noted from all 4 extremities.  These remained stable throughout the case.  The neck was prepped and draped in sterile fashion.  C-arm fluoroscopy was used to localize the appropriate level and a right sided approach was performed and the level was again confirmed with fluoroscopy, and then the retractors were placed.  The microscope was sterilely draped and brought into the field and then the disk space was opened at C4-C5 and C5-C6.  There was quite a generous osteophyte at C5-C6.  This required rongeuring down and  flush with the vertebral body.  The C5 vertebral body was then drilled away using the high-speed drill.  Several pieces were retained for local autograft.  Once the vertebral body was resected and the spinal canal identified, then using curettes, rongeurs and punches, the disks at both levels were resected.  Large free disk fragments were noted at the C4-C5 level, particularly.  Once both areas were well decompressed and the vertebral body resection was wide enough with approximately 80% of vertebral body resected, a DePuy Synthes Bengal cage was trialed and a 26 mm parallel implant packed with both a combination of local autograft as well as allograft cancellous chips.  This was then seated into the cavity between C4 and C6 and placed into good position as confirmed with fluoroscopy.  Then, a DePuy Synthes Vectra plate was placed.  This was secured to the vertebral bodies as well with screws.  The bottom left screw, however, stripped out and did not have good purchase, even with a rescue screw.  This was left out.  Once this was done and hemostasis was achieved, the platysma was closed with 3-0 inverted interrupted Vicryl, 4-0 subcuticular Monocryl was used for skin and Exofin placed as a dressing.  The patient was awakened, extubated, and taken to the recovery room in good condition.    Aly Lees MD        D: 2021   T: 2021   MT: md/JOSE MANUEL    Name:     SERJIO MADDOX  MRN:      [id number]        Account:        [id number]   :      1970           Procedure Date: 2021     Document: [id number]

## 2021-12-17 NOTE — PROGRESS NOTES
[facility]    Neurosurgery  Daily Note    Assessment & Plan   Procedure(s):  Right Cervical 5 corpectomy with cage reconstruction, plating, arthrodesis using local autograft. Sky Ch tong placement   1 Day Post-Op  POD1 s/p right C5 corpectomy with cage reconstruction, plating, arthrodesis using local autograft. No intraoperative complications. EBL 200ml. Pain well managed, 3/10. Ambulating with assist and tolerating. Numbness in hands and feet slightly improved compared to pre op. Incision c/d/i.      CERVICAL SPINE TWO TO THREE VIEWS   12/17/2021 8:10 AM      HISTORY: Cervical spine stenosis, postoperative.     COMPARISON: MRI 11/29/2021.                                                                      IMPRESSION: Postoperative change of C5 corpectomy and C4-C6 anterior  fusion. Alignment is within normal limits. Prevertebral soft tissue  swelling/fluid and gas, consistent with recent surgery.     MARY GIBBONS MD      PLAN:   -Floor status   -C spine XR tomorrow AM   -PT nad OT consults  -Brace to be worn when out of bed x 6 weeks time. May remove when laying down, eating, sitting.   -Pain management- continue with scheduled robaxin  -No NSAIDs  -Discharge today versus tomorrow- RN to update on progress later today  -Plans reviewed with Dr. Lees  -Call with questions     Eal Jurado PA-C  [facility] Neurosurgery  [address] [address]    Tel [phone]  Pager [phone]    Active Problems:    Surgery, elective      Ela Jurado    Interval History   Stable     Physical Exam   Temp: 98.2  F (36.8  C) Temp src: Oral BP: 135/77 Pulse: 77   Resp: 14 SpO2: 97 % O2 Device: None (Room air) Oxygen Delivery: 1 LPM  Vitals:    12/16/21 0834   Weight: 265 lb 14.4 oz (120.6 kg)     Vital Signs with Ranges  Temp:  [93.3  F (34.1  C)-98.7  F (37.1  C)] 98.2  F (36.8  C)  Pulse:  [] 77  Resp:  [11-26] 14  BP:  (113-146)/(60-87) 135/77  MAP:  [89 mmHg-99 mmHg] 92 mmHg  Arterial Line BP: (134-151)/(67-78) 135/72  SpO2:  [93 %-98 %] 97 %  I/O last 3 completed shifts:  In: 2160 [P.O.:960; I.V.:1200]  Out: 3450 [Urine:3250; Blood:200]    Awake, alert, oriented  Anterior cervical incision c/d/i   Collar in place   PAIGE symmetrically   Negative clonus       Medications        acetaminophen  975 mg Oral Q8H     methocarbamol  750 mg Oral Q6H     polyethylene glycol  17 g Oral Daily     senna-docusate  1 tablet Oral BID     sodium chloride (PF)  3 mL Intracatheter Q8H     vitamin D2  50,000 Units Oral Once per day on Mon Thu       Plans discussed with Dr. Lees who was in agreement with plans    Ela SO [facility] Neurosurgery  [address]  [address] [facility]  [place] [address]    Tel [phone]  Pager [phone]

## 2021-12-18 ENCOUNTER — APPOINTMENT (OUTPATIENT)
Dept: PHYSICAL THERAPY | Facility: CLINIC | Age: 51
DRG: 472 | End: 2021-12-18
Attending: NEUROLOGICAL SURGERY
Payer: COMMERCIAL

## 2021-12-18 ENCOUNTER — APPOINTMENT (OUTPATIENT)
Dept: OCCUPATIONAL THERAPY | Facility: CLINIC | Age: 51
DRG: 472 | End: 2021-12-18
Attending: PHYSICIAN ASSISTANT
Payer: COMMERCIAL

## 2021-12-18 VITALS
OXYGEN SATURATION: 97 % | TEMPERATURE: 98.6 F | HEIGHT: 72 IN | HEART RATE: 89 BPM | RESPIRATION RATE: 18 BRPM | DIASTOLIC BLOOD PRESSURE: 73 MMHG | WEIGHT: 265.9 LBS | BODY MASS INDEX: 36.01 KG/M2 | SYSTOLIC BLOOD PRESSURE: 129 MMHG

## 2021-12-18 LAB — HGB BLD-MCNC: 14 G/DL (ref 13.3–17.7)

## 2021-12-18 PROCEDURE — 85018 HEMOGLOBIN: CPT | Performed by: PHYSICIAN ASSISTANT

## 2021-12-18 PROCEDURE — 97535 SELF CARE MNGMENT TRAINING: CPT | Mod: GO

## 2021-12-18 PROCEDURE — 97116 GAIT TRAINING THERAPY: CPT | Mod: GP

## 2021-12-18 PROCEDURE — 36415 COLL VENOUS BLD VENIPUNCTURE: CPT | Performed by: PHYSICIAN ASSISTANT

## 2021-12-18 PROCEDURE — 97165 OT EVAL LOW COMPLEX 30 MIN: CPT | Mod: GO

## 2021-12-18 PROCEDURE — 97530 THERAPEUTIC ACTIVITIES: CPT | Mod: GP

## 2021-12-18 PROCEDURE — 97530 THERAPEUTIC ACTIVITIES: CPT | Mod: GO

## 2021-12-18 PROCEDURE — 250N000013 HC RX MED GY IP 250 OP 250 PS 637: Performed by: PHYSICIAN ASSISTANT

## 2021-12-18 RX ADMIN — SENNOSIDES AND DOCUSATE SODIUM 1 TABLET: 50; 8.6 TABLET ORAL at 09:08

## 2021-12-18 RX ADMIN — METHOCARBAMOL 750 MG: 750 TABLET ORAL at 00:17

## 2021-12-18 RX ADMIN — METHOCARBAMOL 750 MG: 750 TABLET ORAL at 05:48

## 2021-12-18 RX ADMIN — ACETAMINOPHEN 975 MG: 325 TABLET, FILM COATED ORAL at 09:07

## 2021-12-18 RX ADMIN — ACETAMINOPHEN 975 MG: 325 TABLET, FILM COATED ORAL at 00:17

## 2021-12-18 RX ADMIN — POLYETHYLENE GLYCOL 3350 17 G: 17 POWDER, FOR SOLUTION ORAL at 09:08

## 2021-12-18 RX ADMIN — METHOCARBAMOL 750 MG: 750 TABLET ORAL at 11:17

## 2021-12-18 ASSESSMENT — ACTIVITIES OF DAILY LIVING (ADL)
ADLS_ACUITY_SCORE: 5

## 2021-12-18 NOTE — PLAN OF CARE
Physical Therapy Discharge Summary    Reason for therapy discharge:    Discharged to home with assist of spouse.    Progress towards therapy goal(s). See goals on Care Plan in [facility] electronic health record for goal details.  Goals met    Therapy recommendation(s):    Continued therapy is recommended.  Rationale/Recommendations:  recommend outpt PT to address high level balance. Defer to neurosurgery for timing of rehab. . Wife educated during session on recommended level of assist to provide pt. Recommend continue with walking program at home.

## 2021-12-18 NOTE — PROGRESS NOTES
12/18/21 0800   Quick Adds   Type of Visit Initial Occupational Therapy Evaluation   Living Environment   People in home spouse;child(caridad), dependent   Current Living Arrangements house   Home Accessibility stairs to enter home;stairs within home   Number of Stairs, Main Entrance 3   Stair Railings, Main Entrance none   Number of Stairs, Within Home, Primary greater than 10 stairs   Stair Railings, Within Home, Primary railings safe and in good condition   Transportation Anticipated car, drives self;family or friend will provide   Living Environment Comments Patient lives in single leve homes w/ wife and dtr. Stairs to basement but pt does not need to negotiate stairs for access to all needs within the home. Pt sleeps in standard flat bed and uses tub shower, thinks he has a shower chair but was not using it prior to surgery.    Self-Care   Usual Activity Tolerance good   Current Activity Tolerance moderate   Equipment Currently Used at Home none   Activity/Exercise/Self-Care Comment Pt IND at baseline with all ADL tasks and ambulates w/o AD. Pt reports he has a good support system and home set up for assist and supervision between family and friends.    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   Disability/Function   Hearing Difficulty or Deaf no   Wear Glasses or Blind no   Concentrating, Remembering or Making Decisions Difficulty no   Difficulty Communicating no   Difficulty Eating/Swallowing no   Walking or Climbing Stairs Difficulty no   Dressing/Bathing Difficulty no   Toileting issues no   Doing Errands Independently Difficulty (such as shopping) no   Fall history within last six months yes   Number of times patient has fallen within last six months 1   General Information   Onset of Illness/Injury or Date of Surgery 12/16/21  "  Referring Physician Ela Jurado PA-C   Patient/Family Therapy Goal Statement (OT) \"to go home and be with my family\"   Additional Occupational Profile Info/Pertinent History of Current Problem The patient is a 51-year-old male with progressive numbness and tingling of his extremities and weakness and balance difficulties.  MRI of the cervical spine demonstrated a large herniated disk at C4-C5 with extension behind the C4 vertebral body as well as to a lesser degree a left herniated disk at C5-C6.  Given the extensive nature of these and extension behind the vertebral body of C4, a corpectomy was planned in order to decompress and stabilize the spine.   Existing Precautions/Restrictions fall;brace worn when out of bed;spinal   Cognitive Status Examination   Orientation Status orientation to person, place and time   Visual Perception   Visual Impairment/Limitations WFL   Sensory   Sensory Comments Reports some numbness and tingling in hnads and feet   Pain Assessment   Patient Currently in Pain No   Range of Motion Comprehensive   Comment, General Range of Motion WFL   Strength Comprehensive (MMT)   Comment, General Manual Muscle Testing (MMT) Assessment BUE WFL   Bed Mobility   Comment (Bed Mobility) Did not observe during evaluation however pt reports no concerns with this    Clinical Impression   Criteria for Skilled Therapeutic Interventions Met (OT) yes;meets criteria;skilled treatment is necessary   OT Diagnosis Impaired ADL/IADL s/p   OT Problem List-Impairments impacting ADL problems related to;activity tolerance impaired;strength;post-surgical precautions;mobility   Assessment of Occupational Performance 5 or more Performance Deficits   Identified Performance Deficits dressing, bathing, functional mobility, home mgmt, heavy chores   Planned Therapy Interventions (OT) ADL retraining;IADL retraining;strengthening;transfer training;home program guidelines   Clinical Decision Making Complexity (OT) low " complexity   Therapy Frequency (OT) 1x eval and treat   Predicted Duration of Therapy 1x eval and treat   Risk & Benefits of therapy have been explained care plan/treatment goals reviewed;evaluation/treatment results reviewed;current/potential barriers reviewed;risks/benefits reviewed;participants voiced agreement with care plan;participants included;patient   OT Discharge Planning    OT Discharge Recommendation (DC Rec) Home with assist   OT Rationale for DC Rec Pt is functioning below baseline s/p however demonstrates SBA for functional mobility w/o AD, SBA for dressing and bathing. Expect pt to discharge home with assist of family and friends with heavy IADL chores such as driving and shoveling snow and home mgmt tasks.    Total Evaluation Time (Minutes)   Total Evaluation Time (Minutes) 10

## 2021-12-18 NOTE — PLAN OF CARE
Occupational Therapy Discharge Summary    Reason for therapy discharge:    All goals and outcomes met, no further needs identified. Pt likely discharging home w/ assist of family today.    Progress towards therapy goal(s). See goals on Care Plan in [facility] electronic health record for goal details.  Goals met    Therapy recommendation(s):    Continue home exercise program. Pt has good family and friend network and support system. Demonstrated community distance mobility SBA-Supervision, SBA shower (walk-in; in room), demonstrates ability to don/doff aspen collar, total body dressing SBA. All questions answered at this time.   Pt has shower chair available as needed but demonstrated shower standing during OT tx session w/o difficulty.

## 2021-12-18 NOTE — DISCHARGE SUMMARY
[facility]    Discharge Summary   [facility] Neurosurgery    Date of Admission:  12/16/2021  Date of Discharge:  12/18/2021  Discharging Provider: Ela Jurado  Date of Service (when I saw the patient): 12/18/21    Discharge Diagnoses   Active Problems:    Surgery, elective      History of Present Illness   Pernell Martinez is an 51 year old male who presented with progressive numbness and tingling of his extremities and weakness and balance difficulties.  MRI of the cervical spine demonstrated a large herniated disk at C4-C5 with extension behind the C4 vertebral body as well as to a lesser degree a left herniated disk at C5-C6.  Given the extensive nature of these and extension behind the vertebral body of C4, a corpectomy was planned in order to decompress and stabilize the spine.    Hospital Course   Pernell Martinez was admitted on 12/16/2021.  Pernell Martinez is an 51 year old male who presented with progressive numbness and tingling of his extremities and weakness and balance difficulties.  MRI of the cervical spine demonstrated a large herniated disk at C4-C5 with extension behind the C4 vertebral body as well as to a lesser degree a left herniated disk at C5-C6.  Given the extensive nature of these and extension behind the vertebral body of C4, a corpectomy was planned in order to decompress and stabilize the spine.    On 12/16/21, patient underwent right C5 corpectomy with cage reconstruction and plating, C4-C6 with anterior arthrodesis using local autograft and allograft cancellous chips with Dr Lees. No intraoperative complications. EBL 200ml. Patient admitted for pain management, neurologic monitoring, wound care. Patient meeting all discharge on 12/18/21. Patient was cleared by Neurosurgery for discharge to home in stable condition.     I have discussed the following assessment and plan with Dr. Pinzon who is in agreement with initial plan and will follow up with  further consultation recommendations.    Ela Jurado PA-C  [facility] Neurosurgery  [facility]  [address]  [address]  [place] [address]    Tel [phone]  Pager [phone]      Significant Results and Procedures   n/a    Pending Results   These results will be followed up by n/a  Unresulted Labs Ordered in the Past 30 Days of this Admission     No orders found from 11/16/2021 to 12/17/2021.          Code Status   Full Code    Primary Care Physician   Trish Bravo    Physical Exam   Temp: 98.6  F (37  C) Temp src: Oral BP: 129/73 Pulse: 89   Resp: 18 SpO2: 97 % O2 Device: None (Room air)    Vitals:    12/16/21 0834   Weight: 265 lb 14.4 oz (120.6 kg)     Vital Signs with Ranges  Temp:  [98.6  F (37  C)-99.7  F (37.6  C)] 98.6  F (37  C)  Pulse:  [75-89] 89  Resp:  [16-18] 18  BP: (113-142)/(55-78) 129/73  SpO2:  [97 %-98 %] 97 %  No intake/output data recorded.    Awake, alert, appropriate  Anterior cervical incision c/d/i   c collar in place   5/5 motor strength BUE and BLE   Negative clonus     Time Spent on this Encounter   IEla PA-C, personally saw the patient today and spent less than or equal to 30 minutes discharging this patient.    Discharge Disposition   Discharged to home  Condition at discharge: Stable    Consultations This Hospital Stay   OCCUPATIONAL THERAPY ADULT IP CONSULT  PHYSICAL THERAPY ADULT IP CONSULT  ORTHOSIS CERVICAL COLLAR IP CONSULT    Discharge Orders      Physical Therapy Referral      Reason for your hospital stay    Right Cervical 5 corpectomy with cage reconstruction, plating, arthrodesis using local autograft     Follow-up and recommended labs and tests     Your Neurosurgical follow up appointments have been scheduled. You may call [phone] to make, confirm or change your follow-up Neurosurgery appointment dates and/or times.     Activity    Your activity upon discharge: Please limit your lifting to no more that ten pounds  and avoid excessive jostling and jarring activities.     Wound care and dressings    Instructions to care for your wound at home: ice to area for comfort, keep wound clean and dry, and may get incision wet in shower on 12/17/21 but do not soak or scrub.     Diet    Follow this diet upon discharge: Regular     Discharge Medications   Current Discharge Medication List      START taking these medications    Details   methocarbamol (ROBAXIN) 750 MG tablet Take 1 tablet (750 mg) by mouth every 6 hours Take scheduled basis (every 6 hours) for 48 hours after discharge, then take as needed  Qty: 40 tablet, Refills: 0    Associated Diagnoses: Stenosis of cervical spine with myelopathy (H)      oxyCODONE (ROXICODONE) 5 MG tablet Take 1 tablet (5 mg) by mouth every 6 hours as needed for breakthrough pain  Qty: 40 tablet, Refills: 0    Associated Diagnoses: Stenosis of cervical spine with myelopathy (H)      senna-docusate (SENOKOT-S/PERICOLACE) 8.6-50 MG tablet Take 1 tablet by mouth 2 times daily as needed for constipation  Qty: 40 tablet, Refills: 0    Associated Diagnoses: Stenosis of cervical spine with myelopathy (H)         CONTINUE these medications which have NOT CHANGED    Details   ergocalciferol (ERGOCALCIFEROL) 1.25 MG (02303 UT) capsule 1 unit of Vitamin D equals 0.025 mcg of Vitamin D. Take 50,000 units by mouth  wice a week.      triamcinolone (KENALOG) 0.1 % external ointment            Allergies   Allergies   Allergen Reactions     Cats      Other reaction(s): Sneezing     Dogs      Other reaction(s): Sneezing     Hepatitis B Virus Vaccine Other (See Comments)     HEPATITIS LIKE SYMPTOMS     Other Environmental Allergy      Seasonal allergies     Nickel Rash

## 2021-12-18 NOTE — PLAN OF CARE
Patient vital signs are at baseline: Yes  Patient able to ambulate as they were prior to admission or with assist devices provided by therapies during their stay:  Yes  Patient MUST void prior to discharge:  Yes  Patient able to tolerate oral intake:  Yes  Pain has adequate pain control using Oral analgesics:  Yes    Pt A&Ox4, voiding per aldrich, VSS, aspen collar to be worn when OOB. Surgical incision CDI, open to air. Baseline numbness in both hands and feet. Ambulating SBA. Will continue to monitor.

## 2021-12-29 ENCOUNTER — DOCUMENTATION ONLY (OUTPATIENT)
Dept: NEUROSURGERY | Facility: CLINIC | Age: 51
End: 2021-12-29
Payer: COMMERCIAL

## 2021-12-29 NOTE — PROGRESS NOTES
Faxed Physical Therapy Referral December 29, 2021 to [facility]  Physical Therapy at fax number [phone]    Right Fax confirmed at 946 AM    Luis Felipe Ambriz RN

## 2022-01-05 ENCOUNTER — OFFICE VISIT (OUTPATIENT)
Dept: NEUROSURGERY | Facility: CLINIC | Age: 52
End: 2022-01-05
Attending: NURSE PRACTITIONER
Payer: COMMERCIAL

## 2022-01-05 VITALS — SYSTOLIC BLOOD PRESSURE: 142 MMHG | OXYGEN SATURATION: 98 % | DIASTOLIC BLOOD PRESSURE: 80 MMHG | HEART RATE: 67 BPM

## 2022-01-05 DIAGNOSIS — G99.2 STENOSIS OF CERVICAL SPINE WITH MYELOPATHY (H): Primary | ICD-10-CM

## 2022-01-05 DIAGNOSIS — M48.02 STENOSIS OF CERVICAL SPINE WITH MYELOPATHY (H): Primary | ICD-10-CM

## 2022-01-05 PROCEDURE — G0463 HOSPITAL OUTPT CLINIC VISIT: HCPCS

## 2022-01-05 PROCEDURE — 99024 POSTOP FOLLOW-UP VISIT: CPT | Performed by: NURSE PRACTITIONER

## 2022-01-05 ASSESSMENT — PAIN SCALES - GENERAL: PAINLEVEL: NO PAIN (1)

## 2022-01-05 NOTE — PATIENT INSTRUCTIONS
-Continue with routine post operative care plan at this time.  -Keep your incision clean and dry. Okay to shower and gently pat dry. No swimming or submerging under water until 6 weeks post op.   -No lifting greater than 10-15 pounds. Limit bending and twisting.   -Follow up in 4 weeks with XR prior, as scheduled.   -Call the clinic or go to the Emergency Room if you develop any new pain, weakness, incision drainage/swelling, or fever. Go to the Emergency Room if sudden onset of severe headache, weakness, confusion, change in level of consciousness, or seizure activity.    -Andriy to follow up with Primary Care provider regarding elevated blood pressure.

## 2022-01-05 NOTE — NURSING NOTE
Pernell Martinez is a 51 year old male who presents for:  Chief Complaint   Patient presents with     Surgical Followup     2 wk f/u DOS 12/16/21         Initial Vitals:  BP (!) 142/80   Pulse 67   SpO2 98%  Estimated body mass index is 36.06 kg/m  as calculated from the following:    Height as of 12/16/21: 6' (1.829 m).    Weight as of 12/16/21: 265 lb 14.4 oz (120.6 kg).. There is no height or weight on file to calculate BSA. BP completed using cuff size: regular  No Pain (1)    Rafael Dior MA

## 2022-01-05 NOTE — PROGRESS NOTES
[facility] Neurosurgery Follow-Up:     HPI: 2 weeks s/p right C5 corpectomy with cage reconstruction and plating at C4-6 with Dr. Lees on 12/16/2021. Presents today for incision check. Patient reports minimal neck/incisional pain. Has noted 25-30% improvement in BUE numbness as well as leg symptoms. Denies any incisional concerns. Denies any new pain, paresthesias, or weakness. Denies any headaches, dizziness, nausea, vision changes, or weakness. Denies post op concerns today.     Current pain: 1/10    Exam:  Constitutional:  Alert, well nourished, NAD.  HEENT: Normocephalic, atraumatic.   Pulm:  Without shortness of breath   CV:  No pitting edema of BLE.     Vital Signs: BP (!) 142/80   Pulse 67   SpO2 98%     Neurological:  Awake  Alert  Oriented x 3  Speech clear  Motor exam: 5/5 strength in upper and lower extremities bilaterally         Sensation normal to upper and lower extremities bilaterally     Gait: Able to stand from a seated position.     Incision: Incision is clean, dry, intact. No drainage, dehiscence, erythema, warmth, or swelling noted.      Assessment/Plan:     Patient Instructions   -Continue with routine post operative care plan at this time.  -Keep your incision clean and dry. Okay to shower and gently pat dry. No swimming or submerging under water until 6 weeks post op.   -No lifting greater than 10-15 pounds. Limit bending and twisting.   -Follow up in 4 weeks with XR prior, as scheduled.   -Call the clinic or go to the Emergency Room if you develop any new pain, weakness, incision drainage/swelling, or fever. Go to the Emergency Room if sudden onset of severe headache, weakness, confusion, change in level of consciousness, or seizure activity.    -Andriy to follow up with Primary Care provider regarding elevated blood pressure.    Discussed red flag symptoms and advised to seek medical attention if these develop. Patient voiced understanding and agreement.      Mary Leyva  SENTHIL  [facility]  [address] [place] [address]  [place] [address]  Tel [phone]  Fax [phone]

## 2022-01-05 NOTE — LETTER
1/5/2022         RE: Pernell Martinez  [address] [place] [id number]        Dear Colleague,    Thank you for referring your patient, Pernell Martinez, to the [facility] NEUROSURGERY CLINIC [place]. Please see a copy of my visit note below.    [facility] Neurosurgery Follow-Up:     HPI: 2 weeks s/p right C5 corpectomy with cage reconstruction and plating at C4-6 with Dr. Lees on 12/16/2021. Presents today for incision check. Patient reports minimal neck/incisional pain. Has noted 25-30% improvement in BUE numbness as well as leg symptoms. Denies any incisional concerns. Denies any new pain, paresthesias, or weakness. Denies any headaches, dizziness, nausea, vision changes, or weakness. Denies post op concerns today.     Current pain: 1/10    Exam:  Constitutional:  Alert, well nourished, NAD.  HEENT: Normocephalic, atraumatic.   Pulm:  Without shortness of breath   CV:  No pitting edema of BLE.     Vital Signs: BP (!) 142/80   Pulse 67   SpO2 98%     Neurological:  Awake  Alert  Oriented x 3  Speech clear  Motor exam: 5/5 strength in upper and lower extremities bilaterally         Sensation normal to upper and lower extremities bilaterally     Gait: Able to stand from a seated position.     Incision: Incision is clean, dry, intact. No drainage, dehiscence, erythema, warmth, or swelling noted.      Assessment/Plan:     Patient Instructions   -Continue with routine post operative care plan at this time.  -Keep your incision clean and dry. Okay to shower and gently pat dry. No swimming or submerging under water until 6 weeks post op.   -No lifting greater than 10-15 pounds. Limit bending and twisting.   -Follow up in 4 weeks with XR prior, as scheduled.   -Call the clinic or go to the Emergency Room if you develop any new pain, weakness, incision drainage/swelling, or fever. Go to the Emergency Room if sudden onset of severe headache, weakness, confusion, change in level of consciousness, or  seizure activity.    -Andriy to follow up with Primary Care provider regarding elevated blood pressure.    Discussed red flag symptoms and advised to seek medical attention if these develop. Patient voiced understanding and agreement.      Mary Leyav CNP  [address] [address]  Tel [phone]  Fax [phone]              Again, thank you for allowing me to participate in the care of your patient.        Sincerely,        Mary Leyva, NP

## 2022-01-06 ENCOUNTER — TRANSFERRED RECORDS (OUTPATIENT)
Dept: NEUROSURGERY | Facility: CLINIC | Age: 52
End: 2022-01-06

## 2022-01-27 ENCOUNTER — ANCILLARY PROCEDURE (OUTPATIENT)
Dept: GENERAL RADIOLOGY | Facility: CLINIC | Age: 52
End: 2022-01-27
Attending: PHYSICIAN ASSISTANT
Payer: COMMERCIAL

## 2022-01-27 ENCOUNTER — OFFICE VISIT (OUTPATIENT)
Dept: NEUROSURGERY | Facility: CLINIC | Age: 52
End: 2022-01-27
Payer: COMMERCIAL

## 2022-01-27 VITALS — OXYGEN SATURATION: 99 % | SYSTOLIC BLOOD PRESSURE: 143 MMHG | HEART RATE: 66 BPM | DIASTOLIC BLOOD PRESSURE: 81 MMHG

## 2022-01-27 DIAGNOSIS — Z98.1 S/P CERVICAL SPINAL FUSION: Primary | ICD-10-CM

## 2022-01-27 DIAGNOSIS — Z98.1 S/P CERVICAL SPINAL FUSION: ICD-10-CM

## 2022-01-27 PROCEDURE — 72040 X-RAY EXAM NECK SPINE 2-3 VW: CPT | Performed by: RADIOLOGY

## 2022-01-27 PROCEDURE — 99024 POSTOP FOLLOW-UP VISIT: CPT | Performed by: PHYSICIAN ASSISTANT

## 2022-01-27 NOTE — PROGRESS NOTES
Neurosurgery ACDF 6-week follow-up    Mr. Martinez is a 51-year-old male who presents to clinic for follow-up from CT 4-6 anterior cervical discectomy and fusion with C5 corpectomy.  Patient states his numbness in his upper extremities is much improved as is his gait.  He denies any issues with speech or swallowing.  Denies any issues with his incision.  He is continuing to wear cervical collar as directed.    Exam    Alert and oriented no acute distress  Bilateral upper extremities with 5 out of 5 strength  Mateo sign negative  Incision is well-healed    Imaging    Stable appearance of the fusion hardware from C4-C6.    Assessment    Status post C4-6 ACDF with C5 corpectomy      Plan    Discussed with Dr. Lees, we would recommend the patient continue to wear his brace until the 3-month follow-up.  If he needs to return to work in the interim he can return on light duty while continuing to wear his brace.    Follow-up visit in 6 weeks with cervical x-ray with AP/lateral views prior.

## 2022-01-27 NOTE — LETTER
1/27/2022         RE: Pernell Martinez  [address] [place] [id number]        Dear Colleague,    Thank you for referring your patient, Pernell Martinez, to the [facility] NEUROLOGY CLINICS [facility]. Please see a copy of my visit note below.    Neurosurgery ACDF 6-week follow-up    Mr. Martinez is a 51-year-old male who presents to clinic for follow-up from CT 4-6 anterior cervical discectomy and fusion with C5 corpectomy.  Patient states his numbness in his upper extremities is much improved as is his gait.  He denies any issues with speech or swallowing.  Denies any issues with his incision.  He is continuing to wear cervical collar as directed.    Exam    Alert and oriented no acute distress  Bilateral upper extremities with 5 out of 5 strength  Mateo sign negative  Incision is well-healed    Imaging    Stable appearance of the fusion hardware from C4-C6.    Assessment    Status post C4-6 ACDF with C5 corpectomy      Plan    Discussed with Dr. Lees, we would recommend the patient continue to wear his brace until the 3-month follow-up.  If he needs to return to work in the interim he can return on light duty while continuing to wear his brace.    Follow-up visit in 6 weeks with cervical x-ray with AP/lateral views prior.              Again, thank you for allowing me to participate in the care of your patient.        Sincerely,        Miller Wong PA-C

## 2022-01-28 ENCOUNTER — TELEPHONE (OUTPATIENT)
Dept: NEUROSURGERY | Facility: CLINIC | Age: 52
End: 2022-01-28
Payer: COMMERCIAL

## 2022-01-28 NOTE — TELEPHONE ENCOUNTER
Spoke to patient, he asked to have his letter for work restrictions faxed to his employer. I faxed it to [employer] [employer] [facility] at [phone] at 1125am.

## 2022-01-31 ENCOUNTER — TELEPHONE (OUTPATIENT)
Dept: NEUROSURGERY | Facility: CLINIC | Age: 52
End: 2022-01-31
Payer: COMMERCIAL

## 2022-01-31 NOTE — TELEPHONE ENCOUNTER
Patient called to ask if he can remove his neck brace while driving. Patient is s/p C4-6 ACDF with C5 corpectomy on 12/16/21 with Dr. Lees.  Message routed to care team for review.

## 2022-01-31 NOTE — TELEPHONE ENCOUNTER
If he does not feel he can drive safely with the neck brace I would recommend he limit the amount of time he spends driving. If he feels he can drive safely with his neck brace it would be better to wear while he is driving.

## 2022-01-31 NOTE — TELEPHONE ENCOUNTER
Andriy called-in to ask if he can take his neck brace off while driving. Please reach him at [phone]

## 2022-02-01 NOTE — TELEPHONE ENCOUNTER
Returned patient call regarding neck brace question. Reviewed below message from Miller Wong PA-C with patient. Patient states he is only driving short distances to and from work otherwise his wife drives him everywhere else.    Patient verbalized understanding and states no further questions.

## 2022-03-17 DIAGNOSIS — Z98.1 S/P CERVICAL SPINAL FUSION: Primary | ICD-10-CM

## 2022-03-18 ENCOUNTER — OFFICE VISIT (OUTPATIENT)
Dept: NEUROSURGERY | Facility: CLINIC | Age: 52
End: 2022-03-18
Payer: COMMERCIAL

## 2022-03-18 ENCOUNTER — ANCILLARY PROCEDURE (OUTPATIENT)
Dept: GENERAL RADIOLOGY | Facility: CLINIC | Age: 52
End: 2022-03-18
Attending: PHYSICIAN ASSISTANT
Payer: COMMERCIAL

## 2022-03-18 ENCOUNTER — TELEPHONE (OUTPATIENT)
Dept: NEUROSURGERY | Facility: CLINIC | Age: 52
End: 2022-03-18

## 2022-03-18 VITALS
SYSTOLIC BLOOD PRESSURE: 142 MMHG | RESPIRATION RATE: 16 BRPM | OXYGEN SATURATION: 97 % | BODY MASS INDEX: 35.89 KG/M2 | DIASTOLIC BLOOD PRESSURE: 86 MMHG | WEIGHT: 265 LBS | HEIGHT: 72 IN | HEART RATE: 80 BPM

## 2022-03-18 DIAGNOSIS — Z98.1 S/P CERVICAL SPINAL FUSION: ICD-10-CM

## 2022-03-18 DIAGNOSIS — Z98.1 S/P CERVICAL SPINAL FUSION: Primary | ICD-10-CM

## 2022-03-18 PROCEDURE — 72040 X-RAY EXAM NECK SPINE 2-3 VW: CPT | Performed by: RADIOLOGY

## 2022-03-18 PROCEDURE — 99024 POSTOP FOLLOW-UP VISIT: CPT | Performed by: NEUROLOGICAL SURGERY

## 2022-03-18 NOTE — LETTER
[facility]  Neurosurgery Clinic  [address] [place] [address]  [id number]        March 18, 2022     To Whom it May Concern,      Pernell Martinez  is being seen at our clinic for post-operative follow up care. He/she is able to return to work with the restrictions of:    -No heavy lifting greater than 25 pounds   Patient may increase by 5 pounds every week as tolerated       Please call our clinic with questions or concerns: [phone]      Sincerely,  Aly Lees MD  (Electronically Signed, March 18, 2022 0442)

## 2022-03-18 NOTE — PATIENT INSTRUCTIONS
Patient Next Steps:      Dr. Lees drafted a work letter for you    Please call us if you have any further questions or concerns.    [facility] Neurosurgery Clinic   Phone: [phone]  Fax: [phone]

## 2022-03-18 NOTE — PROGRESS NOTES
It was a pleasure to see Pernell Martinez today in Neurosurgery Clinic. He is a 51 year old male who underwent:    Procedure Date: 12/16/2021     PREOPERATIVE DIAGNOSIS:  Cervical stenosis with myelopathy.     POSTOPERATIVE DIAGNOSIS:  Cervical stenosis with myelopathy.     PROCEDURE:    1.  Right C5 corpectomy with cage reconstruction and plating, C4-C6 with anterior arthrodesis using local autograft and allograft cancellous chips.  2.  Swanson-Story tong placement.     SURGEON:  Aly Lees MD     ASSISTANT:  Ela Jurado PA-C     ANESTHESIA:  General endotracheal anesthesia.     ESTIMATED BLOOD LOSS:  200 mL    He is doing well.  He notes increased strength and decreased numbness in his upper and lower extremities.  He has been doing physical therapy and is also back to work with a 10 pound restriction.  Overall he feels like he is doing well from the surgery.    Vitals:    03/18/22 1335   BP: (!) 142/86   Pulse: 80   Resp: 16   SpO2: 97%   Weight: 120.2 kg (265 lb)   Height: 1.829 m (6')     Estimated body mass index is 35.94 kg/m  as calculated from the following:    Height as of this encounter: 1.829 m (6').    Weight as of this encounter: 120.2 kg (265 lb).  Data Unavailable    Well-healed anterior cervical incision.  Bilateral upper and lower extremity strength is 5 out of 5 in all muscle groups.    Imaging: X-rays of the cervical spine show stable alignment of the hardware and position of the cage.  There has been some settling.  Of note one of the screws was unable to be placed at the time of surgery.  Overall I think this looks reasonably good.    Assessment: Status post cervical corpectomy for myelopathy.    Plan: At this point I would like to see him back in 9 months with a CT of the cervical spine to evaluate his fusion.  I have given him instructions about how to wean out of the collar by increasing his time out of the collar 1 hour each day.  I will give him a note for work giving him a 25 pound  restriction which he may increase by 5 pounds each week.

## 2022-03-18 NOTE — TELEPHONE ENCOUNTER
ADOREM to call us back to schedule a CT and follow-up w/ Lees on a Friday in [place] in December.

## 2022-03-18 NOTE — LETTER
3/18/2022         RE: Pernell Martinez  [address] [place] [id number]        Dear Colleague,    Thank you for referring your patient, Pernell Martinez, to the [facility] NEUROLOGY [facility]. Please see a copy of my visit note below.    It was a pleasure to see Pernell Martinez today in Neurosurgery Clinic. He is a 51 year old male who underwent:    Procedure Date: 12/16/2021     PREOPERATIVE DIAGNOSIS:  Cervical stenosis with myelopathy.     POSTOPERATIVE DIAGNOSIS:  Cervical stenosis with myelopathy.     PROCEDURE:    1.  Right C5 corpectomy with cage reconstruction and plating, C4-C6 with anterior arthrodesis using local autograft and allograft cancellous chips.  2.  Swanson-Dayton tong placement.     SURGEON:  Aly Lees MD     ASSISTANT:  Ela Jurado PA-C     ANESTHESIA:  General endotracheal anesthesia.     ESTIMATED BLOOD LOSS:  200 mL    He is doing well.  He notes increased strength and decreased numbness in his upper and lower extremities.  He has been doing physical therapy and is also back to work with a 10 pound restriction.  Overall he feels like he is doing well from the surgery.    Vitals:    03/18/22 1335   BP: (!) 142/86   Pulse: 80   Resp: 16   SpO2: 97%   Weight: 120.2 kg (265 lb)   Height: 1.829 m (6')     Estimated body mass index is 35.94 kg/m  as calculated from the following:    Height as of this encounter: 1.829 m (6').    Weight as of this encounter: 120.2 kg (265 lb).  Data Unavailable    Well-healed anterior cervical incision.  Bilateral upper and lower extremity strength is 5 out of 5 in all muscle groups.    Imaging: X-rays of the cervical spine show stable alignment of the hardware and position of the cage.  There has been some settling.  Of note one of the screws was unable to be placed at the time of surgery.  Overall I think this looks reasonably good.    Assessment: Status post cervical corpectomy for myelopathy.    Plan: At this point I would like to see  him back in 9 months with a CT of the cervical spine to evaluate his fusion.  I have given him instructions about how to wean out of the collar by increasing his time out of the collar 1 hour each day.  I will give him a note for work giving him a 25 pound restriction which he may increase by 5 pounds each week.         Again, thank you for allowing me to participate in the care of your patient.        Sincerely,        Aly Lees MD

## 2022-03-18 NOTE — NURSING NOTE
Pernell Martinez is a 51 year old male who presents for:  Chief Complaint   Patient presents with     Surgical Followup     12 week         Initial Vitals:  BP (!) 142/86   Pulse 80   Resp 16   Ht 6' (1.829 m)   Wt 265 lb (120.2 kg)   SpO2 97%   BMI 35.94 kg/m   Estimated body mass index is 35.94 kg/m  as calculated from the following:    Height as of this encounter: 6' (1.829 m).    Weight as of this encounter: 265 lb (120.2 kg).. Body surface area is 2.47 meters squared. BP completed using cuff size: wrist cuff  Data Unavailable    Nursing Comments:     Mary Latham, CMA

## 2022-10-03 ENCOUNTER — HEALTH MAINTENANCE LETTER (OUTPATIENT)
Age: 52
End: 2022-10-03

## 2022-11-18 ENCOUNTER — ANCILLARY PROCEDURE (OUTPATIENT)
Dept: CT IMAGING | Facility: CLINIC | Age: 52
End: 2022-11-18
Attending: NEUROLOGICAL SURGERY
Payer: COMMERCIAL

## 2022-11-18 ENCOUNTER — OFFICE VISIT (OUTPATIENT)
Dept: NEUROSURGERY | Facility: CLINIC | Age: 52
End: 2022-11-18
Payer: COMMERCIAL

## 2022-11-18 VITALS
BODY MASS INDEX: 34 KG/M2 | OXYGEN SATURATION: 99 % | DIASTOLIC BLOOD PRESSURE: 79 MMHG | HEART RATE: 72 BPM | SYSTOLIC BLOOD PRESSURE: 138 MMHG | HEIGHT: 72 IN | WEIGHT: 251 LBS

## 2022-11-18 DIAGNOSIS — Z98.1 S/P CERVICAL SPINAL FUSION: Primary | ICD-10-CM

## 2022-11-18 DIAGNOSIS — Z98.1 S/P CERVICAL SPINAL FUSION: ICD-10-CM

## 2022-11-18 PROCEDURE — 72125 CT NECK SPINE W/O DYE: CPT

## 2022-11-18 PROCEDURE — 99213 OFFICE O/P EST LOW 20 MIN: CPT | Performed by: NEUROLOGICAL SURGERY

## 2022-11-18 NOTE — PROGRESS NOTES
It was a pleasure to see Pernell Martinez today in Neurosurgery Clinic. He is a 51 year old male who underwent:    Procedure Date: 12/16/2021     PREOPERATIVE DIAGNOSIS:  Cervical stenosis with myelopathy.     POSTOPERATIVE DIAGNOSIS:  Cervical stenosis with myelopathy.     PROCEDURE:    1.  Right C5 corpectomy with cage reconstruction and plating, C4-C6 with anterior arthrodesis using local autograft and allograft cancellous chips.  2.  Swanson-Walton tong placement.     SURGEON:  Aly Lees MD     ASSISTANT:  Ela Jurado PA-C     ANESTHESIA:  General endotracheal anesthesia.     ESTIMATED BLOOD LOSS:  200 mL    Overall he is doing well.  He describes some mild residual numbness, but in general his symptoms are significantly improved from prior to surgery.    Vitals:    11/18/22 1428   BP: 138/79   Pulse: 72   SpO2: 99%   Weight: 113.9 kg (251 lb)   Height: 1.829 m (6')     Estimated body mass index is 34.04 kg/m  as calculated from the following:    Height as of this encounter: 1.829 m (6').    Weight as of this encounter: 113.9 kg (251 lb).  Data Unavailable    Awake alert and oriented.  Well-healed anterior cervical incision  Bilateral upper and lower extremity strength is 5-5 in all muscle groups  Reflexes 2+ bilateral biceps and patella    Imaging: CT of the cervical spine shows stable alignment of the hardware and position of the cage.  There appears to be bone growth through and around the cage suggestive of solid fusion.  The imaging was reviewed with the patient to the patient clinic today.    Assessment: Status post cervical corpectomy for myelopathy.    Plan: Overall I think is doing well.  He has describes some recent low back issues and if these worsen we would certainly be happy to see him for these in the future if needed.

## 2022-11-18 NOTE — NURSING NOTE
Pernell Martinez is a 51 year old male who presents for:  Chief Complaint   Patient presents with     Follow Up     9 month follow up, CT prior   DOS: 12/16/21. Right Cervical 5 corpectomy with cage reconstruction, plating, arthrodesis using local autograft. Swanson Wells tong placement (Right)  Patient stated low back pain a month ago. Pain lasted about 1 to 2 weeks. He  experienced some difficulty walking, and  went to the ER. He was given a muscle relaxer. He has a dull ache in his low back post ER visit.        Initial Vitals:  /79   Pulse 72   Ht 6' (1.829 m)   Wt 251 lb (113.9 kg)   SpO2 99%   BMI 34.04 kg/m   Estimated body mass index is 34.04 kg/m  as calculated from the following:    Height as of this encounter: 6' (1.829 m).    Weight as of this encounter: 251 lb (113.9 kg).. Body surface area is 2.41 meters squared. BP completed using cuff size: regular  Data Unavailable    Nursing Comments:     Christopher Acosta

## 2022-11-18 NOTE — LETTER
11/18/2022         RE: Pernell Martinez  [address] [place] [id number]        Dear Colleague,    Thank you for referring your patient, Pernell Martinez, to the [facility] NEUROLOGY [facility]. Please see a copy of my visit note below.    It was a pleasure to see Pernell Martinez today in Neurosurgery Clinic. He is a 51 year old male who underwent:    Procedure Date: 12/16/2021     PREOPERATIVE DIAGNOSIS:  Cervical stenosis with myelopathy.     POSTOPERATIVE DIAGNOSIS:  Cervical stenosis with myelopathy.     PROCEDURE:    1.  Right C5 corpectomy with cage reconstruction and plating, C4-C6 with anterior arthrodesis using local autograft and allograft cancellous chips.  2.  Swanson-Alderson tong placement.     SURGEON:  Aly Lees MD     ASSISTANT:  Ela Jurado PA-C     ANESTHESIA:  General endotracheal anesthesia.     ESTIMATED BLOOD LOSS:  200 mL    Overall he is doing well.  He describes some mild residual numbness, but in general his symptoms are significantly improved from prior to surgery.    Vitals:    11/18/22 1428   BP: 138/79   Pulse: 72   SpO2: 99%   Weight: 113.9 kg (251 lb)   Height: 1.829 m (6')     Estimated body mass index is 34.04 kg/m  as calculated from the following:    Height as of this encounter: 1.829 m (6').    Weight as of this encounter: 113.9 kg (251 lb).  Data Unavailable    Awake alert and oriented.  Well-healed anterior cervical incision  Bilateral upper and lower extremity strength is 5-5 in all muscle groups  Reflexes 2+ bilateral biceps and patella    Imaging: CT of the cervical spine shows stable alignment of the hardware and position of the cage.  There appears to be bone growth through and around the cage suggestive of solid fusion.  The imaging was reviewed with the patient to the patient clinic today.    Assessment: Status post cervical corpectomy for myelopathy.    Plan: Overall I think is doing well.  He has describes some recent low back issues and if these  worsen we would certainly be happy to see him for these in the future if needed.         Again, thank you for allowing me to participate in the care of your patient.        Sincerely,        Aly Lees MD

## 2023-02-11 ENCOUNTER — HEALTH MAINTENANCE LETTER (OUTPATIENT)
Age: 53
End: 2023-02-11

## 2024-03-09 ENCOUNTER — HEALTH MAINTENANCE LETTER (OUTPATIENT)
Age: 54
End: 2024-03-09

## 2025-03-16 ENCOUNTER — HEALTH MAINTENANCE LETTER (OUTPATIENT)
Age: 55
End: 2025-03-16

## (undated) DEVICE — SU VICRYL 3-0 SH CR 8X18" J774

## (undated) DEVICE — SPONGE SURGIFOAM 100 1974

## (undated) DEVICE — SYR 10ML LL W/O NDL 302995

## (undated) DEVICE — NDL BLUNT 17GA 1.5" 8881202330

## (undated) DEVICE — GLOVE PROTEXIS BLUE W/NEU-THERA 8.5  2D73EB85

## (undated) DEVICE — IMPLANTABLE DEVICE: Type: IMPLANTABLE DEVICE | Site: SPINE CERVICAL | Status: NON-FUNCTIONAL

## (undated) DEVICE — GLOVE PROTEXIS W/NEU-THERA 8.0  2D73TE80

## (undated) DEVICE — GOWN XXL 9575

## (undated) DEVICE — PAD CHUX UNDERPAD 23X24" 7136

## (undated) DEVICE — STRAP POSITIONING VELCRO 13' CERVICAL HARNESS 920877

## (undated) DEVICE — NDL SPINAL 18GA 3.5" 405184

## (undated) DEVICE — PROTECTOR ARM ONE-STEP TRENDELENBURG 40418

## (undated) DEVICE — DRAPE MAYO STAND 23X54 8337

## (undated) DEVICE — IONM UP TO 7 HOURS

## (undated) DEVICE — GLOVE PROTEXIS BLUE W/NEU-THERA 8.0  2D73EB80

## (undated) DEVICE — GLOVE PROTEXIS BLUE W/NEU-THERA 6.5  2D73EB65

## (undated) DEVICE — MANIFOLD NEPTUNE 4 PORT 700-20

## (undated) DEVICE — IOM SUPPLIES

## (undated) DEVICE — ESU GROUND PAD ADULT W/CORD E7507

## (undated) DEVICE — PACK SPINE SM CUSTOM SNE15SSFSK

## (undated) DEVICE — DECANTER VIAL 2006S

## (undated) DEVICE — PREP CHLORAPREP 26ML TINTED ORANGE  260815

## (undated) DEVICE — SU MONOCRYL 3-0 PS-2 27" Y427H

## (undated) DEVICE — ESU PENCIL W/HOLSTER E2350H

## (undated) DEVICE — PREP SKIN SCRUB TRAY 4461A

## (undated) DEVICE — LINEN TOWEL PACK X5 5464

## (undated) DEVICE — ESU ELEC BLADE 4" COATED

## (undated) DEVICE — SOL WATER IRRIG 1000ML BOTTLE 2F7114

## (undated) DEVICE — CATH TRAY FOLEY COUDE SURESTEP 16FR W/URNE MTR STLK A304716A

## (undated) DEVICE — SYR 30ML LL W/O NDL 302832

## (undated) DEVICE — WIPES FOLEY CARE SURESTEP PROVON DFC100

## (undated) DEVICE — GOWN XLG DISP 9545

## (undated) DEVICE — 2.5MM DRILL BIT

## (undated) DEVICE — RX SURGIFLO HEMOSTATIC MATRIX W/THROMBIN 8ML 2994

## (undated) DEVICE — RX SURGIFLO W/THROMBIN KIT 2ML 1991

## (undated) DEVICE — SOL NACL 0.9% IRRIG 1000ML BOTTLE 2F7124

## (undated) DEVICE — ESU ELEC BLADE 2.75" COATED/INSULATED E1455

## (undated) DEVICE — GLOVE PROTEXIS POWDER FREE 6.5 ORTHOPEDIC 2D73ET65

## (undated) DEVICE — PACK UNIVERSAL SPLIT 29131

## (undated) DEVICE — GLOVE PROTEXIS MICRO 7.5  2D73PM75

## (undated) DEVICE — DRAPE SHEET REV FOLD 3/4 9349

## (undated) DEVICE — DRAPE C-ARM 60X42" 1013

## (undated) DEVICE — NDL ANGIOCATH 14GA 2" 4088

## (undated) DEVICE — TOOL DISSECT MIDAS MR8 14CM MATCH HEAD 3MM MR8-14MH30

## (undated) RX ORDER — CEFAZOLIN SODIUM IN 0.9 % NACL 3 G/100 ML
INTRAVENOUS SOLUTION, PIGGYBACK (ML) INTRAVENOUS
Status: DISPENSED
Start: 2021-12-16

## (undated) RX ORDER — PROPOFOL 10 MG/ML
INJECTION, EMULSION INTRAVENOUS
Status: DISPENSED
Start: 2021-12-16

## (undated) RX ORDER — REMIFENTANIL HYDROCHLORIDE 1 MG/ML
INJECTION, POWDER, LYOPHILIZED, FOR SOLUTION INTRAVENOUS
Status: DISPENSED
Start: 2021-12-16

## (undated) RX ORDER — FENTANYL CITRATE 50 UG/ML
INJECTION, SOLUTION INTRAMUSCULAR; INTRAVENOUS
Status: DISPENSED
Start: 2021-12-16

## (undated) RX ORDER — HYDROMORPHONE HYDROCHLORIDE 1 MG/ML
INJECTION, SOLUTION INTRAMUSCULAR; INTRAVENOUS; SUBCUTANEOUS
Status: DISPENSED
Start: 2021-12-16

## (undated) RX ORDER — HYDROMORPHONE HCL IN WATER/PF 6 MG/30 ML
PATIENT CONTROLLED ANALGESIA SYRINGE INTRAVENOUS
Status: DISPENSED
Start: 2021-12-16

## (undated) RX ORDER — ACETAMINOPHEN 325 MG/1
TABLET ORAL
Status: DISPENSED
Start: 2021-12-16

## (undated) RX ORDER — DEXAMETHASONE SODIUM PHOSPHATE 4 MG/ML
INJECTION, SOLUTION INTRA-ARTICULAR; INTRALESIONAL; INTRAMUSCULAR; INTRAVENOUS; SOFT TISSUE
Status: DISPENSED
Start: 2021-12-16

## (undated) RX ORDER — ONDANSETRON 2 MG/ML
INJECTION INTRAMUSCULAR; INTRAVENOUS
Status: DISPENSED
Start: 2021-12-16

## (undated) RX ORDER — LIDOCAINE HYDROCHLORIDE 20 MG/ML
INJECTION, SOLUTION EPIDURAL; INFILTRATION; INTRACAUDAL; PERINEURAL
Status: DISPENSED
Start: 2021-12-16

## (undated) RX ORDER — FENTANYL CITRATE 0.05 MG/ML
INJECTION, SOLUTION INTRAMUSCULAR; INTRAVENOUS
Status: DISPENSED
Start: 2021-12-16